# Patient Record
Sex: FEMALE | Race: WHITE
[De-identification: names, ages, dates, MRNs, and addresses within clinical notes are randomized per-mention and may not be internally consistent; named-entity substitution may affect disease eponyms.]

---

## 2017-10-12 ENCOUNTER — HOSPITAL ENCOUNTER (OUTPATIENT)
Dept: HOSPITAL 75 - RAD | Age: 72
End: 2017-10-12
Payer: MEDICARE

## 2017-10-12 DIAGNOSIS — Z12.31: Primary | ICD-10-CM

## 2017-10-12 PROCEDURE — 77067 SCR MAMMO BI INCL CAD: CPT

## 2017-10-13 NOTE — DIAGNOSTIC IMAGING REPORT
Bilateral screening mammogram 2D views with tomosynthesis



The current study was also evaluated with a Computer Aided

Detection (CAD) system.



INDICATION: Screening. No current complaints stated on the

questionnaire.



COMPARISON: 06/01/2015.



FINDINGS:

The breasts are composed of heterogeneously dense parenchyma

which may decrease mammographic sensitivity. There is no mass,

architectural distortion, or suspicious cluster of calcifications

seen. Allowing for technique and positional differences, no

suspicious change is seen.



IMPRESSION: No significant change.



ACR BI-RADS Category 2: Benign findings.

Result letter will be mailed to the patient.

Note: At least 10% of breast cancer is not imaged by mammography.



  Dictated on workstation # [id number]

## 2018-10-31 ENCOUNTER — HOSPITAL ENCOUNTER (OUTPATIENT)
Dept: HOSPITAL 75 - RAD | Age: 73
End: 2018-10-31
Payer: MEDICARE

## 2018-10-31 DIAGNOSIS — R92.8: ICD-10-CM

## 2018-10-31 DIAGNOSIS — Z12.31: Primary | ICD-10-CM

## 2018-10-31 PROCEDURE — 77067 SCR MAMMO BI INCL CAD: CPT

## 2018-12-03 ENCOUNTER — HOSPITAL ENCOUNTER (OUTPATIENT)
Dept: HOSPITAL 75 - RAD | Age: 73
End: 2018-12-03
Payer: MEDICARE

## 2018-12-03 DIAGNOSIS — R92.2: Primary | ICD-10-CM

## 2018-12-03 PROCEDURE — 76642 ULTRASOUND BREAST LIMITED: CPT

## 2018-12-03 NOTE — DIAGNOSTIC IMAGING REPORT
INDICATION: Right breast nodularity.



Correlation is made with diagnostic mammogram earlier the same

day and screening mammogram from 10/31/2018.



Sonographic interrogation of the lower outer left breast was

performed. No sonographic abnormality is seen. No solid or cystic

mass is detected.



IMPRESSION: 



BI-RADS 1. 



No sonographic abnormality is seen. The patient may return to

routine annual screening mammography.



ACR BI-RADS Category 1: Negative.

Result letter will be mailed to the patient.

Note:  At least 10% of breast cancer is not imaged by

mammography.



Dictated by: 



  Dictated on workstation # [id number]

## 2018-12-03 NOTE — DIAGNOSTIC IMAGING REPORT
Indication: Left breast density. Patient presents for additional

views.



Correlation is made with recent screening mammogram from

10/31/2018.



Unilateral left 2-D and 3-D diagnostic mammography was performed

including spot compression cc and ML views as well as 90 degree

lateral view and rolled cc views.



 There is mild residual density in the outer aspect of the left

breast which appears to be inferiorly located on the tomographic

images. Further evaluation of this area with  ultrasound is

recommended. No suspicious calcifications are seen.



Impression: BI-RADS zero



Mild residual density in the lower outer left breast posterior

depth 10 cm from the nipple after additional views. Further

evaluation with ultrasound is recommended.



ACR BI-RADS Category 0: Incomplete. (Needs additional imaging

evaluation).

Result letter will be mailed to the patient.

Note: At least 10% of breast cancer is not imaged by mammography.



Dictated by: 



  Dictated on workstation # [id number]

## 2019-07-16 ENCOUNTER — HOSPITAL ENCOUNTER (OUTPATIENT)
Dept: HOSPITAL 75 - ER | Age: 74
Setting detail: OBSERVATION
LOS: 1 days | Discharge: NURSING, REHAB OR LONG-TERM CARE | End: 2019-07-17
Attending: FAMILY MEDICINE | Admitting: FAMILY MEDICINE
Payer: MEDICARE

## 2019-07-16 VITALS — WEIGHT: 168.02 LBS | HEIGHT: 65 IN | BODY MASS INDEX: 27.99 KG/M2

## 2019-07-16 VITALS — DIASTOLIC BLOOD PRESSURE: 94 MMHG | SYSTOLIC BLOOD PRESSURE: 135 MMHG

## 2019-07-16 VITALS — DIASTOLIC BLOOD PRESSURE: 73 MMHG | SYSTOLIC BLOOD PRESSURE: 134 MMHG

## 2019-07-16 DIAGNOSIS — Z88.7: ICD-10-CM

## 2019-07-16 DIAGNOSIS — S32.019A: Primary | ICD-10-CM

## 2019-07-16 DIAGNOSIS — Z88.8: ICD-10-CM

## 2019-07-16 DIAGNOSIS — Z79.899: ICD-10-CM
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
## 2019-07-16 DIAGNOSIS — F02.80: ICD-10-CM

## 2019-07-16 DIAGNOSIS — Z79.891: ICD-10-CM

## 2019-07-16 DIAGNOSIS — Z80.3: ICD-10-CM

## 2019-07-16 DIAGNOSIS — Z88.5: ICD-10-CM

## 2019-07-16 DIAGNOSIS — Z98.42: ICD-10-CM

## 2019-07-16 DIAGNOSIS — R10.13: ICD-10-CM

## 2019-07-16 DIAGNOSIS — Z98.41: ICD-10-CM

## 2019-07-16 DIAGNOSIS — Z91.048: ICD-10-CM

## 2019-07-16 DIAGNOSIS — Z80.1: ICD-10-CM

## 2019-07-16 DIAGNOSIS — I10: ICD-10-CM

## 2019-07-16 DIAGNOSIS — Z79.82: ICD-10-CM

## 2019-07-16 DIAGNOSIS — Z80.0: ICD-10-CM

## 2019-07-16 DIAGNOSIS — F32.9: ICD-10-CM

## 2019-07-16 DIAGNOSIS — Z90.710: ICD-10-CM

## 2019-07-16 DIAGNOSIS — G30.9: ICD-10-CM
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
## 2019-07-16 LAB
ALBUMIN SERPL-MCNC: 4.2 GM/DL (ref 3.2–4.5)
ALP SERPL-CCNC: 73 U/L (ref 40–136)
ALT SERPL-CCNC: 22 U/L (ref 0–55)
BASOPHILS # BLD AUTO: 0 10^3/UL (ref 0–0.1)
BASOPHILS NFR BLD AUTO: 1 % (ref 0–10)
BILIRUB SERPL-MCNC: 0.4 MG/DL (ref 0.1–1)
BUN/CREAT SERPL: 19
CALCIUM SERPL-MCNC: 9.8 MG/DL (ref 8.5–10.1)
CHLORIDE SERPL-SCNC: 106 MMOL/L (ref 98–107)
CO2 SERPL-SCNC: 26 MMOL/L (ref 21–32)
CREAT SERPL-MCNC: 1.02 MG/DL (ref 0.6–1.3)
EOSINOPHIL # BLD AUTO: 0.1 10^3/UL (ref 0–0.3)
EOSINOPHIL NFR BLD AUTO: 1 % (ref 0–10)
ERYTHROCYTE [DISTWIDTH] IN BLOOD BY AUTOMATED COUNT: 13.6 % (ref 10–14.5)
GFR SERPLBLD BASED ON 1.73 SQ M-ARVRAT: 53 ML/MIN
GLUCOSE SERPL-MCNC: 103 MG/DL (ref 70–105)
HCT VFR BLD CALC: 40 % (ref 35–52)
HGB BLD-MCNC: 13.1 G/DL (ref 11.5–16)
LYMPHOCYTES # BLD AUTO: 1.8 X 10^3 (ref 1–4)
LYMPHOCYTES NFR BLD AUTO: 21 % (ref 12–44)
MANUAL DIFFERENTIAL PERFORMED BLD QL: NO
MCH RBC QN AUTO: 29 PG (ref 25–34)
MCHC RBC AUTO-ENTMCNC: 33 G/DL (ref 32–36)
MCV RBC AUTO: 88 FL (ref 80–99)
MONOCYTES # BLD AUTO: 0.7 X 10^3 (ref 0–1)
MONOCYTES NFR BLD AUTO: 8 % (ref 0–12)
NEUTROPHILS # BLD AUTO: 5.8 X 10^3 (ref 1.8–7.8)
NEUTROPHILS NFR BLD AUTO: 70 % (ref 42–75)
PLATELET # BLD: 253 10^3/UL (ref 130–400)
PMV BLD AUTO: 9.4 FL (ref 7.4–10.4)
POTASSIUM SERPL-SCNC: 4.3 MMOL/L (ref 3.6–5)
PROT SERPL-MCNC: 6.6 GM/DL (ref 6.4–8.2)
SODIUM SERPL-SCNC: 140 MMOL/L (ref 135–145)
WBC # BLD AUTO: 8.4 10^3/UL (ref 4.3–11)

## 2019-07-16 PROCEDURE — 72125 CT NECK SPINE W/O DYE: CPT

## 2019-07-16 PROCEDURE — 93005 ELECTROCARDIOGRAM TRACING: CPT

## 2019-07-16 PROCEDURE — 36415 COLL VENOUS BLD VENIPUNCTURE: CPT

## 2019-07-16 PROCEDURE — 74177 CT ABD & PELVIS W/CONTRAST: CPT

## 2019-07-16 PROCEDURE — 96374 THER/PROPH/DIAG INJ IV PUSH: CPT

## 2019-07-16 PROCEDURE — 94664 DEMO&/EVAL PT USE INHALER: CPT

## 2019-07-16 PROCEDURE — 80053 COMPREHEN METABOLIC PANEL: CPT

## 2019-07-16 PROCEDURE — 72170 X-RAY EXAM OF PELVIS: CPT

## 2019-07-16 PROCEDURE — 96361 HYDRATE IV INFUSION ADD-ON: CPT

## 2019-07-16 PROCEDURE — 85025 COMPLETE CBC W/AUTO DIFF WBC: CPT

## 2019-07-16 PROCEDURE — 96375 TX/PRO/DX INJ NEW DRUG ADDON: CPT

## 2019-07-16 PROCEDURE — 71260 CT THORAX DX C+: CPT

## 2019-07-16 PROCEDURE — 70450 CT HEAD/BRAIN W/O DYE: CPT

## 2019-07-16 RX ADMIN — SODIUM CHLORIDE, SODIUM LACTATE, POTASSIUM CHLORIDE, AND CALCIUM CHLORIDE SCH MLS/HR: 600; 310; 30; 20 INJECTION, SOLUTION INTRAVENOUS at 18:07

## 2019-07-16 RX ADMIN — OXYCODONE HYDROCHLORIDE AND ACETAMINOPHEN PRN TAB: 5; 325 TABLET ORAL at 18:30

## 2019-07-16 NOTE — XMS REPORT
[facility]

                             Created on: 2016



Estrella Rose

External Reference #: [id number]

: 1945

Sex: Female



Demographics







                          Address                   [address]

[place]  [address]

 

                          Home Phone                [phone]

 

                          Preferred Language        Unknown

 

                          Marital Status            Unknown

 

                          Denominational Affiliation     Unknown

 

                          Race                      White

 

                          Ethnic Group              Not  or 





Author







                          Author                    ARELY PIMENTEL

 

                          [facility]              [employer]

 

                          Address                   Unknown

 

                          Phone                     Unavailable







Care Team Providers







                    Care Team Member Name    Role                Phone

 

                    ARELY PIMENTEL    CP                  Unavailable



                                                                



Allergies

          No Known Allergies                                                    
                                   



Problems

          





             Problem Type    Condition    Code         Onset Dates    Condition Status

 

             Assessment    Bipolar II disorder    F31.81                    Active

 

             Problem      Bipolar II disorder    F31.81                    Active



                                                                                
                 



Medications

          No Known Medications                                                  
                                     



Procedures

          





                Procedure       Coding System    Code            Date

 

                Psychotherapy, patient &/family, 45 minutes, established patient    CPT-4           90660           

2016

 

                [facility] VISIT MENTAL HEALTH ESTAB PT    CPT-4                      2016



                                                                                
       



Results

          No Known Results                                                      
             



Summary Purpose

          eClinicalWorks Submission

## 2019-07-16 NOTE — XMS REPORT
[facility]

                             Created on: 2016



Estrella Rose

External Reference #: [id number]

: 1945

Sex: Female



Demographics







                          Address                   [address]

[place]  [address]

 

                          Home Phone                [phone]

 

                          Preferred Language        Unknown

 

                          Marital Status            Unknown

 

                          Scientologist Affiliation     Unknown

 

                          Race                      White

 

                          Ethnic Group              Not  or 





Author







                          Author                    ARELY PIMENTEL

 

                          [facility]              e[employer]

 

                          Address                   Unknown

 

                          Phone                     Unavailable







Care Team Providers







                    Care Team Member Name    Role                Phone

 

                    ARELY PIMENTEL    CP                  Unavailable



                                                                



Allergies

          No Known Allergies                                                    
                                   



Problems

          





             Problem Type    Condition    Code         Onset Dates    Condition Status

 

             Assessment    Bipolar II disorder    F31.81                    Active

 

             Problem      Bipolar II disorder    F31.81                    Active



                                                                                
                 



Medications

          No Known Medications                                                  
                                     



Procedures

          





                Procedure       Coding System    Code            Date

 

                Psychotherapy, patient &/family, 45 minutes, established patient    CPT-4           29740           

2016

 

                [facility] VISIT MENTAL HEALTH ESTAB PT    CPT-4                      2016



                                                                                
       



Results

          No Known Results                                                      
             



Summary Purpose

          eClinicalWorks Submission

## 2019-07-16 NOTE — XMS REPORT
[facility]

                             Created on: 10/08/2017



Estrella Rose

External Reference #: [id number]

: 1945

Sex: Female



Demographics







                          Address                   [address]

[place]  [address]

 

                          Preferred Language        Unknown

 

                          Marital Status            Unknown

 

                          Samaritan Affiliation     Unknown

 

                          Race                      Unknown

 

                          Ethnic Group              Unknown





Author







                          Author                    SHANE POWERS

 

                          [facility]

 

                          Address                   [facility] [place]  [address]



 

                          Phone                     [phone]







Care Team Providers







                    Care Team Member Name    Role                Phone

 

                    SHANE POWERS    Unavailable         [phone]







PROBLEMS







          Type      Condition    ICD9-CM Code    JGH84-YF Code    Onset Dates    Condition Status    SNOMED

 Code

 

          Problem    Bipolar I disorder with anxious distress              F31.9               Active    [id number]









ALLERGIES

No Information



SOCIAL HISTORY

Never Assessed



PLAN OF CARE







                          Activity                  Details









                                         









                          Follow Up                 Next available Reason:BH Follow-up







VITAL SIGNS





MEDICATIONS







        Medication    Instructions    Dosage    Frequency    Start Date    End Date    Duration    Status



 

        BusPIRone HCl                                                    Active

 

        Lamotrigine                                                    Active

 

        Venlafaxine HCl ER                                                    Active

 

        Omeprazole                                                    Active

 

        Colace                                                    Active

 

        Simvastatin                                                    Active

 

        Latanoprost                                                    Active

 

        Lisinopril                                                    Active

 

        Lorazepam                                                    Active







RESULTS

No Results



PROCEDURES







                Procedure       Date Ordered    Result          Body Site

 

                [facility] VISIT MENTAL HEALTH ESTAB PT    2017                     

 

                          Psychotherapy, patient &/family, 30 minutes, established patient    2017

                                                     







IMMUNIZATIONS

No Known Immunizations

## 2019-07-16 NOTE — XMS REPORT
[facility]

                             Created on: 2018



Estrella Rose

External Reference #: [id number]

: 1945

Sex: Female



Demographics







                          Address                   [address] [place]  [address]

 

                          Preferred Language        Unknown

 

                          Marital Status            Unknown

 

                          Mosque Affiliation     Unknown

 

                          Race                      Unknown

 

                          Ethnic Group              Unknown





Author







                          Author                    SHANE POWERS

 

                          Organization              [facility]

 

                          Address                   [address] [place]  [address]



 

                          Phone                     [phone]







Care Team Providers







                    Care Team Member Name    Role                Phone

 

                    GIOSHABBIRELA    Unavailable         [phone]







PROBLEMS







          Type      Condition    ICD9-CM Code    PHX66-AK Code    Onset Dates    Condition Status    SNOMED

 Code

 

          Problem    Bipolar I disorder with anxious distress              F31.9               Active    [id number]









ALLERGIES

No Information



ENCOUNTERS







                Encounter       Location        Date            Diagnosis

 

                          [facility]     [id number] [place] [address]

                          18 Sep, 2018               

 

                          [place] [id number] [place] [address]

                          13 Aug, 2018              Bipolar I disorder with anxious distress F31.9

 

                          [facility]     [id number] [place] [address]

                                        Bipolar I disorder with anxious distress F31.9

 

                          [facility]     [id number] [place] [address]

                                        Bipolar I disorder with anxious distress F31.9

 

                          [facility]     [id number] [place] [address]

                                        Bipolar I disorder with anxious distress F31.9

 

                          [facility]     [id number] [place] [address]

                          26 Mar, 2018              Bipolar I disorder with anxious distress F31.9

 

                          [facility]     [id number] [place] [address]

                                        Bipolar I disorder with anxious distress F31.9

 

                          [facility]     [id number] [place] [address]

                                        Bipolar I disorder with anxious distress F31.9

 

                          [facility]     [id number] [address]

                                        Bipolar I disorder with anxious distress F31.9

 

                          [facility]     [id number] [address]

                          30 Oct, 2017              Bipolar I disorder with anxious distress F31.9

 

                          [facility]     [id number] [address]

                          02 Oct, 2017              Bipolar I disorder with anxious distress F31.9

 

                          [facility]     [id number] [address]

                          08 Aug, 2017              Bipolar I disorder with anxious distress F31.9

 

                          [facility]     [address] [place] [address]

                                        Bipolar I disorder with anxious distress F31.9

 

                          [facility]     [id number] [address]

                                        Bipolar II disorder F31.81

 

                          [facility]     [id number] [address]

                                         

 

                          [facility]     [id number] [place] [address]

                                        Bipolar II disorder F31.81

 

                          [facility]     [id number] [address]

                          31 May, 2017              Bipolar II disorder F31.81

 

                          [facility]     [id number] [address]

                          17 May, 2017              Bipolar II disorder F31.81

 

                          [facility]     [id number] [address]

                          03 May, 2017              Bipolar II disorder F31.81

 

                          [facility]     [id number] [address]

                                        Bipolar II disorder F31.81

 

                          [facility]     [id number] [address]

                          21 Mar, 2017              Bipolar II disorder F31.81

 

                          [facility]     [address] [facility] [id number] [address]

                                        Bipolar II disorder F31.81

 

                          [facility]     [address] [facility] [id number] [address]

                          13 Oct, 2016              Bipolar II disorder F31.81

 

                          [facility]     [id number] [address]

                          23 Sep, 2016              Bipolar II disorder F31.81

 

                          [facility]     [address] [facility] [id number] [place] [address]

                          15 Sep, 2016              Bipolar II disorder F31.81

 

                          [facility]     [address] [facility] [id number] [place] [address]

                          01 Sep, 2016              Bipolar II disorder F31.81

 

                          [facility]     [id number] [address]

                          18 Aug, 2016              Bipolar II disorder F31.81

 

                          [facility]     [id number] [address]

                          04 Aug, 2016              Bipolar II disorder F31.81

 

                          [facility]     [id number] [place] [address]

                                        Bipolar II disorder F31.81

 

                          [facility]     [id number] [address]

                                        Bipolar II disorder F31.81

 

                          [facility]     [id number] [address]

                          10 Gary, 2016              Bipolar II disorder F31.81

 

                          [facility]     [id number] [address]

                          25 May, 2016              Bipolar II disorder F31.81

 

                          [facility]     [id number] [address]

                          03 May, 2016              Bipolar II disorder F31.81

 

                          [facility]     [id number] [address]

                                        Bipolar II disorder F31.81

 

                          [facility]     [id number] [address]

                          30 Mar, 2016              Bipolar II disorder F31.81

 

                          [facility]     [id number] [address]

                                        Bipolar II disorder F31.81

 

                          [facility]     [id number] [address]

                                        Bipolar II disorder F31.81

 

                          [facility]     [id number] [address]

                          15 Abdirahman, 2016              Bipolar II disorder F31.81

 

                          [facility]     [address] [place] [address]

                                        Bipolar II disorder F31.81

 

                          [facility]     [address] [address]

                          30 Oct, 2015              Bipolar II disorder F31.81

 

                          [facility]     [id number] [address]

                          07 Oct, 2015              Bipolar II disorder F31.81

 

                          [facility]     [id number] [address]

                          22 Sep, 2015              Bipolar II disorder 296.89

 

                          [facility]     [id number] [address]

                          28 Aug, 2015              Bipolar II disorder 296.89







IMMUNIZATIONS

No Known Immunizations



SOCIAL HISTORY

Never Assessed



REASON FOR VISIT

 Follow-up Bipolar Disorder



PLAN OF CARE







                          Activity                  Details









                                         









                          Follow Up                 4 Weeks Reason: Follow-up







VITAL SIGNS





MEDICATIONS

Unknown Medications



RESULTS

No Results



PROCEDURES







                Procedure       Date Ordered    Result          Body Site

 

                [facility] VISIT MENTAL HEALTH ESTAB PT    2018                     

 

                          Psychotherapy, patient &/family, 30 minutes, established patient    2018 

                                                     







INSTRUCTIONS





MEDICATIONS ADMINISTERED

No Known Medications
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
## 2019-07-16 NOTE — XMS REPORT
[facility]

                             Created on: 2018



Estrella Rose

External Reference #: [id number]

: 1945

Sex: Female



Demographics







                          Address                   [address] [place]  [address]

 

                          Preferred Language        Unknown

 

                          Marital Status            Unknown

 

                          Church Affiliation     Unknown

 

                          Race                      Unknown

 

                          Ethnic Group              Unknown





Author







                          Author                    SHANE POWERS

 

                          Organization              [facility]

 

                          Address                   [address] [place]  [address]



 

                          Phone                     [phone]







Care Team Providers







                    Care Team Member Name    Role                Phone

 

                    GIOSHABBIRELA    Unavailable         [phone]







PROBLEMS







          Type      Condition    ICD9-CM Code    HGJ83-ZW Code    Onset Dates    Condition Status    SNOMED

 Code

 

          Problem    Bipolar I disorder with anxious distress              F31.9               Active    [id number]









ALLERGIES

No Information



ENCOUNTERS







                Encounter       Location        Date            Diagnosis

 

                          [facility]     [id number] [place] [address]

                                         

 

                          [place] [id number] [place] [address]

                          26 Mar, 2018              Bipolar I disorder with anxious distress F31.9

 

                          [facility]     [id number] [place] [address]

                                        Bipolar I disorder with anxious distress F31.9

 

                          [facility]     [id number] [place] [address]

                                        Bipolar I disorder with anxious distress F31.9

 

                          [facility]     [id number] [place] [address]

                                        Bipolar I disorder with anxious distress F31.9

 

                          [facility]     [id number] [place] [address]

                          30 Oct, 2017              Bipolar I disorder with anxious distress F31.9

 

                          [facility]     [id number] [place] [address]

                          02 Oct, 2017              Bipolar I disorder with anxious distress F31.9

 

                          [facility]     [id number] [place] [address]

                          08 Aug, 2017              Bipolar I disorder with anxious distress F31.9

 

                          [facility]     [id number] [address]

                                        Bipolar I disorder with anxious distress F31.9

 

                          [facility]     [address] [place] [address]

                                        Bipolar II disorder F31.81

 

                          [facility]     [address] [place] [address]

                                         

 

                          [facility]     [id number] [place] [address]

                                        Bipolar II disorder F31.81

 

                          [facility]     [address] [place] [address]

                          31 May, 2017              Bipolar II disorder F31.81

 

                          [facility]     [address] [place] [address]

                          17 May, 2017              Bipolar II disorder F31.81

 

                          [facility]     [address] [place] [address]

                          03 May, 2017              Bipolar II disorder F31.81

 

                          [facility]     [address] [place] [address]

                                        Bipolar II disorder F31.81

 

                          [facility]     [address] [address]

                          21 Mar, 2017              Bipolar II disorder F31.81

 

                          [facility]     [address] [place] [address]

                                        Bipolar II disorder F31.81

 

                          [facility]     [address] [address]

                          13 Oct, 2016              Bipolar II disorder F31.81

 

                          [facility]     [address] [place] [address]

                          23 Sep, 2016              Bipolar II disorder F31.81

 

                          [facility]     [address] [address]

                          15 Sep, 2016              Bipolar II disorder F31.81

 

                          [facility]     [address] [address]

                          01 Sep, 2016              Bipolar II disorder F31.81

 

                          [facility]     [address] [place] [address], [place] [address]

                          18 Aug, 2016              Bipolar II disorder F31.81

 

                          [facility]     [address] [place] [address]

                          04 Aug, 2016              Bipolar II disorder F31.81

 

                          [facility]     [address] [place] [address]

                                        Bipolar II disorder F31.81

 

                          [facility]     [address] [place] [address]

                                        Bipolar II disorder F31.81

 

                          [facility]     [address] [place] [address]

                          10 Gary, 2016              Bipolar II disorder F31.81

 

                          [facility]     [address] [place] [address]

                          25 May, 2016              Bipolar II disorder F31.81

 

                          [facility]     [address] [place] [address]

                          03 May, 2016              Bipolar II disorder F31.81

 

                          [facility]     [address] [place] [address]

                                        Bipolar II disorder F31.81

 

                          [facility]     [address] [place] [address]

                          30 Mar, 2016              Bipolar II disorder F31.81

 

                          [facility]     [address] [place] [address]

                                        Bipolar II disorder F31.81

 

                          [facility]     [address] [place] [address]

                                        Bipolar II disorder F31.81

 

                          [facility]     [address] [address]

                          15 Abdirahman, 2016              Bipolar II disorder F31.81

 

                          [facility]     [id number] [address]

                                        Bipolar II disorder F31.81

 

                          [facility]     [id number] [address]

                          30 Oct, 2015              Bipolar II disorder F31.81

 

                          [facility]     [id number] [address]

                          07 Oct, 2015              Bipolar II disorder F31.81

 

                          [facility]     [id number] [address]

                          22 Sep, 2015              Bipolar II disorder 296.89

 

                          [facility]     [id number] [address]

                          28 Aug, 2015              Bipolar II disorder 296.89







IMMUNIZATIONS

No Known Immunizations



SOCIAL HISTORY

Never Assessed



REASON FOR VISIT

 Follow-up Bipolar Disorder



PLAN OF CARE







                          Activity                  Details









                                         









                          Follow Up                 Next available Reason: Follow-up







VITAL SIGNS





MEDICATIONS

Unknown Medications



RESULTS

No Results



PROCEDURES







                Procedure       Date Ordered    Result          Body Site

 

                [facility] ESTAB PT    Aug 08, 2017                     

 

                    Psychotherapy, patient &/family, 45 minutes, established patient    Aug 08, 2017        

                                         







INSTRUCTIONS





MEDICATIONS ADMINISTERED

No Known Medications

## 2019-07-16 NOTE — XMS REPORT
[facility]

                             Created on: 2017



Estrella Rose

External Reference #: [id number]

: 1945

Sex: Female



Demographics







                          Address                   [address]

[place]  [address]

 

                          Preferred Language        Unknown

 

                          Marital Status            Unknown

 

                          Sikh Affiliation     Unknown

 

                          Race                      Unknown

 

                          Ethnic Group              Unknown





Author







                          Author                    ARELY PIMENTEL

 

                          Organization              [facility]

 

                          Address                   [address] [place]  [address]



 

                          Phone                     [phone]







Care Team Providers







                    Care Team Member Name    Role                Phone

 

                    ARELY PIMENTEL    Unavailable         [phone]







PROBLEMS







          Type      Condition    ICD9-CM Code    QCJ07-GJ Code    Onset Dates    Condition Status    SNOMED

 Code

 

          Problem    Bipolar II disorder              F31.81              Active    [id number]

 

          Assessment    Bipolar II disorder              F31.81    23 Sep, 2016    Active    [id number]







ALLERGIES

Unknown Allergies



SOCIAL HISTORY

No smoking Hx information available



PLAN OF CARE





VITAL SIGNS





MEDICATIONS

Unknown Medications



RESULTS

No Results



PROCEDURES







                Procedure       Date Ordered    Related Diagnosis    Body Site

 

                [facility] VISIT MENTAL HEALTH ESTAB PT    2016                     

 

                          Psychotherapy, patient &/family, 45 minutes, established patient    2016 

                                                     







IMMUNIZATIONS

No Known Immunizations

## 2019-07-16 NOTE — XMS REPORT
[facility]

                             Created on: 2016



Estrella Rose

External Reference #: [id number]

: 1945

Sex: Female



Demographics







                          Address                   [address]

[place]  [address]

 

                          Home Phone                [phone]

 

                          Preferred Language        Unknown

 

                          Marital Status            Unknown

 

                          Pentecostal Affiliation     Unknown

 

                          Race                      White

 

                          Ethnic Group              Not  or 





Author







                          Author                    ARELY PIMENTEL

 

                          [facility]              [employer]

 

                          Address                   Unknown

 

                          Phone                     Unavailable







Care Team Providers







                    Care Team Member Name    Role                Phone

 

                    ARELY PIMENTEL    CP                  Unavailable



                                                                



Allergies

          No Known Allergies                                                    
                                   



Problems

          





             Problem Type    Condition    Code         Onset Dates    Condition Status

 

             Assessment    Bipolar II disorder    F31.81                    Active

 

             Problem      Bipolar II disorder    F31.81                    Active



                                                                                
                 



Medications

          No Known Medications                                                  
                                     



Procedures

          





                Procedure       Coding System    Code            Date

 

                Psychotherapy, patient &/family, 45 minutes, established patient    CPT-4           69843           

2016

 

                [facility] VISIT MENTAL HEALTH ESTAB PT    CPT-4                      2016



                                                                                
       



Results

          No Known Results                                                      
             



Summary Purpose

          [employer] Submission

## 2019-07-16 NOTE — XMS REPORT
[facility]

                             Created on: 2018



Estrella Rose

External Reference #: [id number]

: 1945

Sex: Female



Demographics







                          Address                   [address] [place]  [address]

 

                          Preferred Language        Unknown

 

                          Marital Status            Unknown

 

                          Zoroastrianism Affiliation     Unknown

 

                          Race                      Unknown

 

                          Ethnic Group              Unknown





Author







                          Author                    SHANE POWERS

 

                          Organization              [facility]

 

                          Address                   [address] [place]  [address]



 

                          Phone                     [phone]







Care Team Providers







                    Care Team Member Name    Role                Phone

 

                    GIOSHABBIRELA    Unavailable         [phone]







PROBLEMS







          Type      Condition    ICD9-CM Code    WLG16-TS Code    Onset Dates    Condition Status    SNOMED

 Code

 

          Problem    Bipolar I disorder with anxious distress              F31.9               Active    [id number]









ALLERGIES

No Information



ENCOUNTERS







                Encounter       Location        Date            Diagnosis

 

                          [facility]     [id number] [place] [address]

                                         

 

                          [place] [id number] [place] [address]

                                        Bipolar I disorder with anxious distress F31.9

 

                          [facility]     [id number] [place] [address]

                          26 Mar, 2018              Bipolar I disorder with anxious distress F31.9

 

                          [facility]     [id number] [place] [address]

                                        Bipolar I disorder with anxious distress F31.9

 

                          [facility]     [id number] [place] [address]

                                        Bipolar I disorder with anxious distress F31.9

 

                          [facility]     [id number] [place] [address]

                                        Bipolar I disorder with anxious distress F31.9

 

                          [facility]     [id number] [place] [address]

                          30 Oct, 2017              Bipolar I disorder with anxious distress F31.9

 

                          [facility]     [id number] [place] [address]

                          02 Oct, 2017              Bipolar I disorder with anxious distress F31.9

 

                          [facility]     [id number] [address]

                          08 Aug, 2017              Bipolar I disorder with anxious distress F31.9

 

                          [facility]     [id number] [address]

                                        Bipolar I disorder with anxious distress F31.9

 

                          [facility]     [id number] [address]

                                        Bipolar II disorder F31.81

 

                          [facility]     [id number] [address]

                                         

 

                          [facility]     [id number] [place] [address]

                                        Bipolar II disorder F31.81

 

                          [facility]     [id number] [address]

                          31 May, 2017              Bipolar II disorder F31.81

 

                          [facility]     [id number] [address]

                          17 May, 2017              Bipolar II disorder F31.81

 

                          [facility]     [address] [place] [address]

                          03 May, 2017              Bipolar II disorder F31.81

 

                          [facility]     [id number] [address]

                                        Bipolar II disorder F31.81

 

                          [facility]     [id number] [address]

                          21 Mar, 2017              Bipolar II disorder F31.81

 

                          [facility]     [id number] [address]

                                        Bipolar II disorder F31.81

 

                          [facility]     [id number] [address]

                          13 Oct, 2016              Bipolar II disorder F31.81

 

                          [facility]     [id number] [address]

                          23 Sep, 2016              Bipolar II disorder F31.81

 

                          [facility]     [id number] [address]

                          15 Sep, 2016              Bipolar II disorder F31.81

 

                          [facility]     [id number] [place] [address]

                          01 Sep, 2016              Bipolar II disorder F31.81

 

                          [facility]     [id number] [place] [address]

                          18 Aug, 2016              Bipolar II disorder F31.81

 

                          [facility]     [address] [place] [address]

                          04 Aug, 2016              Bipolar II disorder F31.81

 

                          [facility]     [address] [place] [address], [place] [address]

                                        Bipolar II disorder F31.81

 

                          [facility]     [id number] [address]

                                        Bipolar II disorder F31.81

 

                          [facility]     [address] [place] [address]

                          10 Gary, 2016              Bipolar II disorder F31.81

 

                          [facility]     [address] [place] [address]

                          25 May, 2016              Bipolar II disorder F31.81

 

                          [facility]     [id number] [address]

                          03 May, 2016              Bipolar II disorder F31.81

 

                          [facility]     [id number] [address]

                                        Bipolar II disorder F31.81

 

                          [facility]     [id number] [address]

                          30 Mar, 2016              Bipolar II disorder F31.81

 

                          [facility]     [id number] [place] [address]

                                        Bipolar II disorder F31.81

 

                          [facility]     [id number] [address]

                                        Bipolar II disorder F31.81

 

                          [facility]     [id number] [address]

                          15 Abdirahman, 2016              Bipolar II disorder F31.81

 

                          [facility]     [id number] [address]

                                        Bipolar II disorder F31.81

 

                          [facility]     [id number] [address]

                          30 Oct, 2015              Bipolar II disorder F31.81

 

                          [facility]     [id number] [address]

                          07 Oct, 2015              Bipolar II disorder F31.81

 

                          [facility]     [id number] [address]

                          22 Sep, 2015              Bipolar II disorder 296.89

 

                          [facility]     [id number] [address]

                          28 Aug, 2015              Bipolar II disorder 296.89







IMMUNIZATIONS

No Known Immunizations



SOCIAL HISTORY

Never Assessed



REASON FOR VISIT

 Follow-up Bipolar Disorder



PLAN OF CARE







                          Activity                  Details









                                         









                          Follow Up                 3 Weeks Reason: Follow-up







VITAL SIGNS





MEDICATIONS

Unknown Medications



RESULTS

No Results



PROCEDURES







                Procedure       Date Ordered    Result          Body Site

 

                [facility] VISIT MENTAL HEALTH ESTAB PT    Oct 02, 2017                     

 

                    Psychotherapy, patient &/family, 45 minutes, established patient    Oct 02, 2017        

                                         







INSTRUCTIONS





MEDICATIONS ADMINISTERED

No Known Medications

## 2019-07-16 NOTE — XMS REPORT
[facility]

                             Created on: 2017



Estrella Rose

External Reference #: [id number]

: 1945

Sex: Female



Demographics







                          Address                   [address]

[place]  [address]

 

                          Preferred Language        Unknown

 

                          Marital Status            Unknown

 

                          Sabianism Affiliation     Unknown

 

                          Race                      Unknown

 

                          Ethnic Group              Unknown





Author







                          Author                    ARELY PIMENTEL

 

                          Organization              [facility]

 

                          Address                   [address] [place]  [address]



 

                          Phone                     [phone]







Care Team Providers







                    Care Team Member Name    Role                Phone

 

                    ARELY PIMENTEL    Unavailable         [phone]







PROBLEMS







          Type      Condition    ICD9-CM Code    RPN05-SL Code    Onset Dates    Condition Status    SNOMED

 Code

 

          Problem    Bipolar II disorder              F31.81              Active    [id number]

 

          Assessment    Bipolar II disorder              F31.81    15 Sep, 2016    Active    [id number]







ALLERGIES

Unknown Allergies



SOCIAL HISTORY

No smoking Hx information available



PLAN OF CARE





VITAL SIGNS





MEDICATIONS

Unknown Medications



RESULTS

No Results



PROCEDURES







                Procedure       Date Ordered    Related Diagnosis    Body Site

 

                [facility] VISIT MENTAL HEALTH ESTAB PT    Sept 15, 2016                     

 

                          Psychotherapy, patient &/family, 45 minutes, established patient    Sept 15, 2016 

                                                     







IMMUNIZATIONS

No Known Immunizations

## 2019-07-16 NOTE — XMS REPORT
[facility]

                             Created on: 2015



Estrella Rose

External Reference #: [id number]

: 1945

Sex: Female



Demographics







                          Address                   [address]

[place]  [address]

 

                          Home Phone                [phone]

 

                          Preferred Language        Unknown

 

                          Marital Status            Unknown

 

                          Amish Affiliation     Unknown

 

                          Race                      White

 

                          Ethnic Group              Not  or 





Author







                          Author                    ARELY PIMENTEL

 

                          [facility]              [employer]

 

                          Address                   Unknown

 

                          Phone                     Unavailable







Care Team Providers







                    Care Team Member Name    Role                Phone

 

                    ARELY PIMENTEL    CP                  Unavailable



                                                                



Allergies

          No Known Allergies                                                    
                                   



Problems

          





             Problem Type    Condition    Code         Onset Dates    Condition Status

 

             Assessment    Bipolar II disorder    F31.81                    Active

 

             Problem      Bipolar II disorder    F31.81                    Active



                                                                                
                 



Medications

          No Known Medications                                                  
                                     



Procedures

          





                Procedure       Coding System    Code            Date

 

                Psychotherapy, patient &/family, 45 minutes, established patient    CPT-4           09527           

2015

 

                [facility] VISIT MENTAL HEALTH ESTAB PT    CPT-4                      2015



                                                                                
       



Results

          No Known Results                                                      
             



Summary Purpose

          [employer] Submission
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
## 2019-07-16 NOTE — XMS REPORT
[facility]

                             Created on: 10/04/2018



Estrella Rose

External Reference #: [id number]

: 1945

Sex: Female



Demographics







                          Address                   [address] [place]  [address]

 

                          Preferred Language        Unknown

 

                          Marital Status            Unknown

 

                          Lutheran Affiliation     Unknown

 

                          Race                      Unknown

 

                          Ethnic Group              Unknown





Author







                          Author                    SHANE POWERS

 

                          Organization              [facility]

 

                          Address                   [address] [place]  [address]



 

                          Phone                     [phone]







Care Team Providers







                    Care Team Member Name    Role                Phone

 

                    GIOSHABBIRELA    Unavailable         [phone]







PROBLEMS







          Type      Condition    ICD9-CM Code    ALB45-DW Code    Onset Dates    Condition Status    SNOMED

 Code

 

          Problem    Bipolar I disorder with anxious distress              F31.9               Active    [id number]









ALLERGIES

No Information



ENCOUNTERS







                Encounter       Location        Date            Diagnosis

 

                          [facility]     [id number] [place] [address]

                          18 Sep, 2018              Bipolar I disorder with anxious distress F31.9

 

                          [facility]     [id number] [place] [address]

                          13 Aug, 2018              Bipolar I disorder with anxious distress F31.9

 

                          [facility]     [id number] [place] [address]

                                        Bipolar I disorder with anxious distress F31.9

 

                          [facility]     [id number] [place] [address]

                                        Bipolar I disorder with anxious distress F31.9

 

                          [facility]     [id number] [place] [address]

                                        Bipolar I disorder with anxious distress F31.9

 

                          [facility]     [id number] [place] [address]

                          26 Mar, 2018              Bipolar I disorder with anxious distress F31.9

 

                          [facility]     [id number] [place] [address]

                                        Bipolar I disorder with anxious distress F31.9

 

                          [facility]     [id number] [place] [address]

                                        Bipolar I disorder with anxious distress F31.9

 

                          [facility]     [address] [facility] [id number] [address]

                                        Bipolar I disorder with anxious distress F31.9

 

                          [facility]     [id number] [address]

                          30 Oct, 2017              Bipolar I disorder with anxious distress F31.9

 

                          [facility]     [id number] [address]

                          02 Oct, 2017              Bipolar I disorder with anxious distress F31.9

 

                          [facility]     [id number] [place] [address]

                          08 Aug, 2017              Bipolar I disorder with anxious distress F31.9

 

                          [facility]     [address] [place] [address]

                                        Bipolar I disorder with anxious distress F31.9

 

                          [facility]     [address] [place] [address]

                                        Bipolar II disorder F31.81

 

                          [facility]     [id number] [address]

                                         

 

                          [facility]     [address] [place] [address]

                                        Bipolar II disorder F31.81

 

                          [facility]     [id number] [address]

                          31 May, 2017              Bipolar II disorder F31.81

 

                          [facility]     [id number] [address]

                          17 May, 2017              Bipolar II disorder F31.81

 

                          [facility]     [id number] [address]

                          03 May, 2017              Bipolar II disorder F31.81

 

                          [facility]     [id number] [address]

                                        Bipolar II disorder F31.81

 

                          [facility]     [id number] [address]

                          21 Mar, 2017              Bipolar II disorder F31.81

 

                          [facility]     [id number] [address]

                                        Bipolar II disorder F31.81

 

                          [facility]     [id number] [address]

                          13 Oct, 2016              Bipolar II disorder F31.81

 

                          [facility]     [id number] [address]

                          23 Sep, 2016              Bipolar II disorder F31.81

 

                          [facility]     [id number] [address]

                          15 Sep, 2016              Bipolar II disorder F31.81

 

                          [facility]     [id number] [place] [address]

                          01 Sep, 2016              Bipolar II disorder F31.81

 

                          [facility]     [id number] [address]

                          18 Aug, 2016              Bipolar II disorder F31.81

 

                          [facility]     [id number] [address]

                          04 Aug, 2016              Bipolar II disorder F31.81

 

                          [facility]     [address] [place] [address]

                                        Bipolar II disorder F31.81

 

                          [facility]     [id number] [address]

                                        Bipolar II disorder F31.81

 

                          [facility]     [id number] [address]

                          10 Gary, 2016              Bipolar II disorder F31.81

 

                          [facility]     [id number] [address]

                          25 May, 2016              Bipolar II disorder F31.81

 

                          [facility]     [id number] [address]

                          03 May, 2016              Bipolar II disorder F31.81

 

                          [facility]     [id number] [address]

                                        Bipolar II disorder F31.81

 

                          [facility]     [id number] [address]

                          30 Mar, 2016              Bipolar II disorder F31.81

 

                          [facility]     [id number] [address]

                                        Bipolar II disorder F31.81

 

                          [facility]     [id number] [address]

                                        Bipolar II disorder F31.81

 

                          [facility]     [id number] [address]

                          15 Abdirahman, 2016              Bipolar II disorder F31.81

 

                          [facility]     [id number] [address]

                                        Bipolar II disorder F31.81

 

                          [facility]     [id number] [address]

                          30 Oct, 2015              Bipolar II disorder F31.81

 

                          [facility]     [id number] [address]

                          07 Oct, 2015              Bipolar II disorder F31.81

 

                          [facility]     [id number] [address]

                          22 Sep, 2015              Bipolar II disorder 296.89

 

                          [facility]     [id number] [address]

                          28 Aug, 2015              Bipolar II disorder 296.89







IMMUNIZATIONS

No Known Immunizations



SOCIAL HISTORY

Never Assessed



REASON FOR VISIT

 Follow-up Bipolar Disorder



PLAN OF CARE





VITAL SIGNS





MEDICATIONS

Unknown Medications



RESULTS

No Results



PROCEDURES







                Procedure       Date Ordered    Result          Body Site

 

                [facility] VISIT MENTAL HEALTH ESTAB PT    2018                     

 

                          Psychotherapy, patient &/family, 30 minutes, established patient    2018 

                                                     







INSTRUCTIONS





MEDICATIONS ADMINISTERED

No Known Medications

## 2019-07-16 NOTE — XMS REPORT
[facility]

                             Created on: 10/26/2017



Estrella Rose

External Reference #: [id number]

: 1945

Sex: Female



Demographics







                          Address                   [address]

[place]  [address]

 

                          Preferred Language        Unknown

 

                          Marital Status            Unknown

 

                          Cheondoism Affiliation     Unknown

 

                          Race                      Unknown

 

                          Ethnic Group              Unknown





Author







                          Author                    SHANE POWERS

 

                          [facility]

 

                          Address                   [facility] [place]  [address]



 

                          Phone                     [phone]







Care Team Providers







                    Care Team Member Name    Role                Phone

 

                    SHANE POWERS    Unavailable         [phone]







PROBLEMS







          Type      Condition    ICD9-CM Code    PMO05-QR Code    Onset Dates    Condition Status    SNOMED

 Code

 

          Problem    Bipolar I disorder with anxious distress              F31.9               Active    [id number]









ALLERGIES

No Information



SOCIAL HISTORY

Never Assessed



PLAN OF CARE







                          Activity                  Details









                                         









                          Follow Up                 2 Weeks Reason: Follow-up







VITAL SIGNS





MEDICATIONS

Unknown Medications



RESULTS

No Results



PROCEDURES







                Procedure       Date Ordered    Result          Body Site

 

                [facility] VISIT MENTAL HEALTH ESTAB PT    May 03, 2017                     

 

                    Psychotherapy, patient &/family, 45 minutes, established patient    May 03, 2017        

                                         







IMMUNIZATIONS

No Known Immunizations
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
## 2019-07-16 NOTE — XMS REPORT
[facility]

                             Created on: 2016



Estrella Rose

External Reference #: [id number]

: 1945

Sex: Female



Demographics







                          Address                   [address]

[place]  [address]

 

                          Home Phone                [phone]

 

                          Preferred Language        Unknown

 

                          Marital Status            Unknown

 

                          Buddhism Affiliation     Unknown

 

                          Race                      White

 

                          Ethnic Group              Not  or 





Author







                          Author                    ARELY PIMENTEL

 

                          [facility]              [employer]

 

                          Address                   Unknown

 

                          Phone                     Unavailable







Care Team Providers







                    Care Team Member Name    Role                Phone

 

                    ARELY PIMENTEL    CP                  Unavailable



                                                                



Allergies

          No Known Allergies                                                    
                                   



Problems

          





             Problem Type    Condition    Code         Onset Dates    Condition Status

 

             Assessment    Bipolar II disorder    F31.81                    Active

 

             Problem      Bipolar II disorder    F31.81                    Active



                                                                                
                 



Medications

          No Known Medications                                                  
                                     



Procedures

          





                Procedure       Coding System    Code            Date

 

                Psychotherapy, patient &/family, 45 minutes, established patient    CPT-4           84936           

2016

 

                [facility] VISIT MENTAL HEALTH ESTAB PT    CPT-4                      2016



                                                                                
       



Results

          No Known Results                                                      
             



Summary Purpose

          [employer] Submission

## 2019-07-16 NOTE — XMS REPORT
[facility]

                             Created on: 2018



Estrella Rose

External Reference #: [id number]

: 1945

Sex: Female



Demographics







                          Address                   [address] [place]  [address]

 

                          Preferred Language        Unknown

 

                          Marital Status            Unknown

 

                          Cheondoism Affiliation     Unknown

 

                          Race                      Unknown

 

                          Ethnic Group              Unknown





Author







                          Author                    SHANE POWERS

 

                          Organization              [facility]

 

                          Address                   [address] [place]  [address]



 

                          Phone                     [phone]







Care Team Providers







                    Care Team Member Name    Role                Phone

 

                    GIOSHABBIRELA    Unavailable         [phone]







PROBLEMS







          Type      Condition    ICD9-CM Code    SDN28-ZW Code    Onset Dates    Condition Status    SNOMED

 Code

 

          Problem    Bipolar I disorder with anxious distress              F31.9               Active    092340333









ALLERGIES

No Information



ENCOUNTERS







                Encounter       Location        Date            Diagnosis

 

                          [place] [id number] [place] [address]

                                         

 

                          [place] [id number] [place] [address]

                                        Bipolar I disorder with anxious distress F31.9

 

                          [facility]     [id number] [place] [address]

                                        Bipolar I disorder with anxious distress F31.9

 

                          [facility]     [id number] [place] [address]

                          26 Mar, 2018              Bipolar I disorder with anxious distress F31.9

 

                          [facility]     [id number] [place] [address]

                                        Bipolar I disorder with anxious distress F31.9

 

                          [facility]     [id number] [place] [address]

                                        Bipolar I disorder with anxious distress F31.9

 

                          [facility]     [id number] [place] [address]

                                        Bipolar I disorder with anxious distress F31.9

 

                          [facility]     [id number] [place] [address]

                          30 Oct, 2017              Bipolar I disorder with anxious distress F31.9

 

                          [facility]     [id number] [address]

                          02 Oct, 2017              Bipolar I disorder with anxious distress F31.9

 

                          [facility]     [address] [facility] [address] [id number] [place] [address]

                          08 Aug, 2017              Bipolar I disorder with anxious distress F31.9

 

                          [facility]     [id number] [place] [address]

                                        Bipolar I disorder with anxious distress F31.9

 

                          [facility]     [address] [facility] [id number] [address]

                                        Bipolar II disorder F31.81

 

                          [facility]     [id number] [place] [address]

                                         

 

                          [facility]     [address] [facility] [id number] [address]

                                        Bipolar II disorder F31.81

 

                          [facility]     [address] [place] [address]

                          31 May, 2017              Bipolar II disorder F31.81

 

                          [facility]     [id number] [address]

                          17 May, 2017              Bipolar II disorder F31.81

 

                          [facility]     [id number] [address]

                          03 May, 2017              Bipolar II disorder F31.81

 

                          [facility]     [id number] [address]

                                        Bipolar II disorder F31.81

 

                          [facility]     [id number] [address]

                          21 Mar, 2017              Bipolar II disorder F31.81

 

                          [facility]     [id number] [address]

                                        Bipolar II disorder F31.81

 

                          [facility]     [id number] [address]

                          13 Oct, 2016              Bipolar II disorder F31.81

 

                          [facility]     [address] [facility] [id number] [address]

                          23 Sep, 2016              Bipolar II disorder F31.81

 

                          [facility]     [address] [facility] [id number] [place] [address]

                          15 Sep, 2016              Bipolar II disorder F31.81

 

                          [facility]     [id number] [place] [address]

                          01 Sep, 2016              Bipolar II disorder F31.81

 

                          [facility]     [id number] [place] [address]

                          18 Aug, 2016              Bipolar II disorder F31.81

 

                          [facility]     [id number] [place] [address], [place] [address]

                          04 Aug, 2016              Bipolar II disorder F31.81

 

                          [facility]     [id number] [place] [address]

                                        Bipolar II disorder F31.81

 

                          [facility]     [id number] [address]

                                        Bipolar II disorder F31.81

 

                          [facility]     [address] [place] [address]

                          10 Gary, 2016              Bipolar II disorder F31.81

 

                          [facility]     [id number] [address]

                          25 May, 2016              Bipolar II disorder F31.81

 

                          [facility]     [id number] [address]

                          03 May, 2016              Bipolar II disorder F31.81

 

                          [facility]     [id number] [address]

                                        Bipolar II disorder F31.81

 

                          [facility]     [id number] [address]

                          30 Mar, 2016              Bipolar II disorder F31.81

 

                          [facility]     [id number] [address]

                                        Bipolar II disorder F31.81

 

                          [facility]     [id number] [address]

                          08 2016              Bipolar II disorder F31.81

 

                          [facility]     [id number] [address]

                          15 Abdirahman, 2016              Bipolar II disorder F31.81

 

                          [facility]     [id number] [address]

                                        Bipolar II disorder F31.81

 

                          [facility]     [id number] [address]

                          30 Oct, 2015              Bipolar II disorder F31.81

 

                          [facility]     [address] [place] [address]

                          07 Oct, 2015              Bipolar II disorder F31.81

 

                          [facility]     [id number] [address]

                          22 Sep, 2015              Bipolar II disorder 296.89

 

                          [facility]     [id number] [address]

                          28 Aug, 2015              Bipolar II disorder 296.89







IMMUNIZATIONS

No Known Immunizations



SOCIAL HISTORY

Never Assessed



REASON FOR VISIT

 Follow-up Bipolar Disorder



PLAN OF CARE







                          Activity                  Details









                                         









                          Follow Up                 Next available Reason: Follow-up







VITAL SIGNS





MEDICATIONS

Unknown Medications



RESULTS

No Results



PROCEDURES







                Procedure       Date Ordered    Result          Body Site

 

                [facility] VISIT MENTAL HEALTH ESTAB PT    2018                     

 

                    Psychotherapy, patient &/family, 30 minutes, established patient    2018        

                                         







INSTRUCTIONS





MEDICATIONS ADMINISTERED

No Known Medications

## 2019-07-16 NOTE — XMS REPORT
[facility]

                             Created on: 2018



Estrella Rose

External Reference #: [id number]

: 1945

Sex: Female



Demographics







                          Address                   [address] [place]  [address]

 

                          Preferred Language        Unknown

 

                          Marital Status            Unknown

 

                          Evangelical Affiliation     Unknown

 

                          Race                      Unknown

 

                          Ethnic Group              Unknown





Author







                          Author                    SHANE POWERS

 

                          Organization              [facility]

 

                          Address                   [address] [place]  [address]



 

                          Phone                     [phone]







Care Team Providers







                    Care Team Member Name    Role                Phone

 

                    GIOSHABBIRELA    Unavailable         [phone]







PROBLEMS







          Type      Condition    ICD9-CM Code    YVI94-WV Code    Onset Dates    Condition Status    SNOMED

 Code

 

          Problem    Bipolar I disorder with anxious distress              F31.9               Active    [id number]









ALLERGIES

No Information



ENCOUNTERS







                Encounter       Location        Date            Diagnosis

 

                          [facility]     [id number] [place] [address]

                          18 Sep, 2018              Bipolar I disorder with anxious distress F31.9

 

                          [facility]     [id number] [place] [address]

                          13 Aug, 2018              Bipolar I disorder with anxious distress F31.9

 

                          [facility]     [id number] [place] [address]

                                        Bipolar I disorder with anxious distress F31.9

 

                          [facility]     [id number] [place] [address]

                                        Bipolar I disorder with anxious distress F31.9

 

                          [facility]     [id number] [place] [address]

                                        Bipolar I disorder with anxious distress F31.9

 

                          [facility]     [id number] [place] [address]

                          26 Mar, 2018              Bipolar I disorder with anxious distress F31.9

 

                          [facility]     [id number] [place] [address]

                                        Bipolar I disorder with anxious distress F31.9

 

                          [facility]     [id number] [place] [address]

                                        Bipolar I disorder with anxious distress F31.9

 

                          [facility]     [address] [facility] [id number] [address]

                                        Bipolar I disorder with anxious distress F31.9

 

                          [facility]     [id number] [address]

                          30 Oct, 2017              Bipolar I disorder with anxious distress F31.9

 

                          [facility]     [id number], [place] [address]

                          02 Oct, 2017              Bipolar I disorder with anxious distress F31.9

 

                          [facility]     [id number] [place] [address]

                          08 Aug, 2017              Bipolar I disorder with anxious distress F31.9

 

                          [facility]     [address] [place] [address]

                                        Bipolar I disorder with anxious distress F31.9

 

                          [facility]     [address] [place] [address]

                                        Bipolar II disorder F31.81

 

                          [facility]     [id number] [address]

                                         

 

                          [facility]     [address] [place] [address]

                                        Bipolar II disorder F31.81

 

                          [facility]     [id number], [place] [address]

                          31 May, 2017              Bipolar II disorder F31.81

 

                          [facility]     [id number], [place] [address]

                          17 May, 2017              Bipolar II disorder F31.81

 

                          [facility]     [id number], [place] [address]

                          03 May, 2017              Bipolar II disorder F31.81

 

                          [facility]     [id number], [place] [address]

                                        Bipolar II disorder F31.81

 

                          [facility]     [id number], [place] [address]

                          21 Mar, 2017              Bipolar II disorder F31.81

 

                          [facility]     [id number], [place] [address]

                                        Bipolar II disorder F31.81

 

                          [facility]     [id number], [place] [address]

                          13 Oct, 2016              Bipolar II disorder F31.81

 

                          [facility]     [id number], [place] [address]

                          23 Sep, 2016              Bipolar II disorder F31.81

 

                          [facility]     [id number], [place] [address]

                          15 Sep, 2016              Bipolar II disorder F31.81

 

                          [facility]     [id number] [place] [address]

                          01 Sep, 2016              Bipolar II disorder F31.81

 

                          [facility]     [id number], [place] [address]

                          18 Aug, 2016              Bipolar II disorder F31.81

 

                          [facility]     [id number], [place] [address]

                          04 Aug, 2016              Bipolar II disorder F31.81

 

                          [facility]     [address] [place] [address]

                                        Bipolar II disorder F31.81

 

                          [facility]     [id number], [place] [address]

                                        Bipolar II disorder F31.81

 

                          [facility]     [id number], [place] [address]

                          10 Gary, 2016              Bipolar II disorder F31.81

 

                          [facility]     [id number], [place] [address]

                          25 May, 2016              Bipolar II disorder F31.81

 

                          [facility]     [id number], [place] [address]

                          03 May, 2016              Bipolar II disorder F31.81

 

                          [facility]     [address], [place] [address]

                                        Bipolar II disorder F31.81

 

                          [facility]     [id number], [place] [address]

                          30 Mar, 2016              Bipolar II disorder F31.81

 

                          [facility]     [id number], [place] [address]

                                        Bipolar II disorder F31.81

 

                          [facility]     [id number], [place] [address]

                                        Bipolar II disorder F31.81

 

                          [facility]     [id number], [place] [address]

                          15 Abdirahman, 2016              Bipolar II disorder F31.81

 

                          [facility]     [id number], [place] [address]

                                        Bipolar II disorder F31.81

 

                          [facility]     [address], [place] [address]

                          30 Oct, 2015              Bipolar II disorder F31.81

 

                          [facility]     [id number], [place] [address]

                          07 Oct, 2015              Bipolar II disorder F31.81

 

                          [facility]     [address], [place] [address]

                          22 Sep, 2015              Bipolar II disorder 296.89

 

                          [facility]     [id number], [place] [address]

                          28 Aug, 2015              Bipolar II disorder 296.89







IMMUNIZATIONS

No Known Immunizations



SOCIAL HISTORY

Never Assessed



REASON FOR VISIT

 Follow-up Bipolar Disorder



PLAN OF CARE







                          Activity                  Details









                                         









                          Follow Up                 Next available Reason: Follow-up







VITAL SIGNS





MEDICATIONS

Unknown Medications



RESULTS

No Results



PROCEDURES







                Procedure       Date Ordered    Result          Body Site

 

                [facility] VISIT MENTAL HEALTH ESTAB PT    Aug 13, 2018                     

 

                    Psychotherapy, patient &/family, 30 minutes, established patient    Aug 13, 2018        

                                         







INSTRUCTIONS





MEDICATIONS ADMINISTERED

No Known Medications

## 2019-07-16 NOTE — XMS REPORT
[facility]

                             Created on: 2018



Estrella Rose

External Reference #: [id number]

: 1945

Sex: Female



Demographics







                          Address                   [address] [place]  [address]

 

                          Preferred Language        Unknown

 

                          Marital Status            Unknown

 

                          Advent Affiliation     Unknown

 

                          Race                      Unknown

 

                          Ethnic Group              Unknown





Author







                          Author                    SHANE POWERS

 

                          Organization              [facility]

 

                          Address                   [address] [place]  [address]



 

                          Phone                     [phone]







Care Team Providers







                    Care Team Member Name    Role                Phone

 

                    GIOSHABBIRELA    Unavailable         [phone]







PROBLEMS







          Type      Condition    ICD9-CM Code    LLO00-DM Code    Onset Dates    Condition Status    SNOMED

 Code

 

          Problem    Bipolar I disorder with anxious distress              F31.9               Active    [id number]









ALLERGIES

No Information



ENCOUNTERS







                Encounter       Location        Date            Diagnosis

 

                          [facility]     [id number] [place] [address]

                                         

 

                          [address] [id number] [place] [address]

                                        Bipolar I disorder with anxious distress F31.9

 

                          [facility]     [id number] [place] [address]

                          26 Mar, 2018              Bipolar I disorder with anxious distress F31.9

 

                          [facility]     [id number] [place] [address]

                                        Bipolar I disorder with anxious distress F31.9

 

                          [facility]     [id number] [place] [address]

                                        Bipolar I disorder with anxious distress F31.9

 

                          [facility]     [id number] [place] [address]

                                        Bipolar I disorder with anxious distress F31.9

 

                          [facility]     [id number] [place] [address]

                          30 Oct, 2017              Bipolar I disorder with anxious distress F31.9

 

                          [facility]     [id number] [place] [address]

                          02 Oct, 2017              Bipolar I disorder with anxious distress F31.9

 

                          [facility]     [id number] [address]

                          08 Aug, 2017              Bipolar I disorder with anxious distress F31.9

 

                          [facility]     [id number] [address]

                                        Bipolar I disorder with anxious distress F31.9

 

                          [facility]     [id number] [address]

                                        Bipolar II disorder F31.81

 

                          [facility]     [id number] [address]

                                         

 

                          [facility]     [id number] [place] [address]

                                        Bipolar II disorder F31.81

 

                          [facility]     [id number] [address]

                          31 May, 2017              Bipolar II disorder F31.81

 

                          [facility]     [id number] [address]

                          17 May, 2017              Bipolar II disorder F31.81

 

                          [facility]     [address] [place] [address]

                          03 May, 2017              Bipolar II disorder F31.81

 

                          [facility]     [id number] [address]

                                        Bipolar II disorder F31.81

 

                          [facility]     [id number] [address]

                          21 Mar, 2017              Bipolar II disorder F31.81

 

                          [facility]     [id number] [address]

                                        Bipolar II disorder F31.81

 

                          [facility]     [id number] [address]

                          13 Oct, 2016              Bipolar II disorder F31.81

 

                          [facility]     [id number] [address]

                          23 Sep, 2016              Bipolar II disorder F31.81

 

                          [facility]     [id number] [address]

                          15 Sep, 2016              Bipolar II disorder F31.81

 

                          [facility]     [id number] [place] [address]

                          01 Sep, 2016              Bipolar II disorder F31.81

 

                          [facility]     [id number] [place] [address]

                          18 Aug, 2016              Bipolar II disorder F31.81

 

                          [facility]     [id number] [place] [address]

                          04 Aug, 2016              Bipolar II disorder F31.81

 

                          [facility]     [id number] [place] [address], [place] [address]

                                        Bipolar II disorder F31.81

 

                          [facility]     [id number] [address]

                                        Bipolar II disorder F31.81

 

                          [facility]     [id number] [place] [address]

                          10 Gary, 2016              Bipolar II disorder F31.81

 

                          [facility]     [id number] [place] [address]

                          25 May, 2016              Bipolar II disorder F31.81

 

                          [facility]     [id number] [address]

                          03 May, 2016              Bipolar II disorder F31.81

 

                          [facility]     [id number] [address]

                                        Bipolar II disorder F31.81

 

                          [facility]     [id number] [address]

                          30 Mar, 2016              Bipolar II disorder F31.81

 

                          [facility]     [id number] [address]

                                        Bipolar II disorder F31.81

 

                          [facility]     [id number] [address]

                                        Bipolar II disorder F31.81

 

                          [facility]     [id number] [address]

                          15 Abdirahman, 2016              Bipolar II disorder F31.81

 

                          [facility]     [id number] [address]

                                        Bipolar II disorder F31.81

 

                          [facility]     [id number] [address]

                          30 Oct, 2015              Bipolar II disorder F31.81

 

                          [facility]     [id number] [address]

                          07 Oct, 2015              Bipolar II disorder F31.81

 

                          [facility]     [id number] [address]

                          22 Sep, 2015              Bipolar II disorder 296.89

 

                          [facility]     [id number] [address]

                          28 Aug, 2015              Bipolar II disorder 296.89







IMMUNIZATIONS

No Known Immunizations



SOCIAL HISTORY

Never Assessed



REASON FOR VISIT

 Follow-up Bipolar Disorder



PLAN OF CARE







                          Activity                  Details









                                         









                          Follow Up                 Next available Reason: Follow-up







VITAL SIGNS





MEDICATIONS







        Medication    Instructions    Dosage    Frequency    Start Date    End Date    Duration    Status



 

        Lisinopril                                                    Active

 

        Omeprazole                                                    Active

 

        Simvastatin                                                    Active

 

        Venlafaxine HCl ER                                                    Active

 

        Namenda                                                    Active

 

        Colace                                                    Active

 

        Latanoprost                                                    Active

 

        Lorazepam                                                    Active

 

        BusPIRone HCl                                                    Active

 

        Lamotrigine                                                    Active







RESULTS

No Results



PROCEDURES







                Procedure       Date Ordered    Result          Body Site

 

                [facility] VISIT MENTAL HEALTH ESTAB PT    2017                     

 

                    Psychotherapy, patient &/family, 45 minutes, established patient    2017        

                                         







INSTRUCTIONS





MEDICATIONS ADMINISTERED

No Known Medications

## 2019-07-16 NOTE — XMS REPORT
Continuity of Care Document

                             Created on: 2019



GRACY OSEGUERA

External Reference #: [id number]

: 1945

Sex: Female



Demographics







                          Address                   [address]  [address]

 

                          Home Phone                [phone] x

 

                          Preferred Language        Unknown

 

                          Marital Status            Unknown

 

                          Rastafarian Affiliation     Unknown

 

                          Race                      Unknown

 

                          Ethnic Group              Unknown





Author







                          Organization              Unknown

 

                          Address                   Unknown

 

                          Phone                     [phone]



              



Allergies

      





             Active              Description              Code              Type              Severity

                Reaction              Onset              Reported/Identified              Relationship

 to Patient                             Clinical Status        

 

             Yes              FLEXARIL              FLEXARIL                             Unknown     

             N/A                             2010                                      

 

                Yes              hydrocodone              [id number]              Drug Allergy        

             Unknown              N/A                             2010                      

                                                 

 

           Yes              TAPE              TAPE                             Unknown              N/A

                                2010                                       

 

                Yes              Tetanus Vaccines   Toxoid              [id number]              Drug Allergy

              Unknown              N/A                             2010              

                                                 

 

                Yes              Tetanus Vaccines and Toxoid              [id number]              Drug

 Allergy              Unknown              N/A                              2010    

                                                             



                          



Medications

      



There is no data.                  



Problems

      





             Date Dx Coded              Attending              Type              Code              Diagnosis

                                        Diagnosed By        

 

                2015              SHERINE CRUZ DO              Ot              294.9          

                                                             

 

                2015              SHERINE JEAN DO              Ot              V76.12       

                                                             

 

                10/13/2017              SHERINE JEAN DO              Ot              Z12.31       

                          ENCNTR SCREEN MAMMOGRAM FOR MALIGNANT NE                       

 

                2017              SHERINE JEAN DO              Ot              Z12.31       

                          ENCNTR SCREEN MAMMOGRAM FOR MALIGNANT NE                       

 

                2018              SHERINE JEAN DO              Ot              R92.8        

                          OTH ABN AND INCONCLUSIVE FINDINGS ON DX                        

 

                2018              SHERINE JEAN DO              Ot              Z12.31       

                          ENCNTR SCREEN MAMMOGRAM FOR MALIGNANT NE                       

 

                2018              SHERINE JEAN DO              Ot              R92.8        

                          OTH ABN AND INCONCLUSIVE FINDINGS ON DX                        

 

                2018              SHERINE JEAN DO              Ot              Z12.31       

                          ENCNTR SCREEN MAMMOGRAM FOR MALIGNANT NE                       

 

                2018              SHERINE JEAN DO              Ot              R92.2        

                          INCONCLUSIVE MAMMOGRAM                       

 

                2018              SHERINE JEAN DO              Ot              R92.2        

                          INCONCLUSIVE MAMMOGRAM                       

 

                2019              SHERINE JEAN DO              Ot              R92.2        

                          INCONCLUSIVE MAMMOGRAM                       



                                      



Procedures

      



There is no data.                  



Results

      



There is no data.              



Encounters

      





                ACCT No.              Visit Date/Time              Discharge              Status      

                Pt. Type              Provider              Facility              Loc./Unit      

                                        Complaint        

 

                    [id number]              2018 14:03:00              2018 23:59:59      

                CLS              Outpatient              SHERINE JEAN DO              [facility]              RAD                       ABNORMAL MAMMOGRAM        

 

                    [id number]              2018 15:50:00              2018 23:59:59      

                CLS              Preadmit              SHERINE JEAN DO              [facility]              RAD                       ABNORMAL MAMMO        

 

                    [id number]              10/31/2018 10:35:00              10/31/2018 23:59:59      

                CLS              Outpatient              SHERINE JEAN DO              [facility]              RAD                       SCREENING        

 

                    [id number]              10/12/2017 10:35:00              10/12/2017 23:59:59      

                CLS              Outpatient              SHERINE JEAN DO              [facility]              RAD                       SCREENING Z12.31        

 

                    [id number]              2015 10:03:00              2015 23:59:59      

                CLS              Outpatient              SHERINE JEAN DO              [facility]              RAD                                

 

                    [id number]              2014 14:22:00              2014 23:59:59      

                CLS              Outpatient              SHERINE CRUZ DO              [facility]              RAD                                

 

                    [id number]              2014 09:56:00              2014 23:59:59      

             CLS              Outpatient

## 2019-07-16 NOTE — XMS REPORT
[facility]

                             Created on: 10/07/2015



Estrella Rose

External Reference #: [id number]

: 1945

Sex: Female



Demographics







                          Address                   [address]

[place]  [address]

 

                          Home Phone                [phone]

 

                          Preferred Language        Unknown

 

                          Marital Status            Unknown

 

                          Denominational Affiliation     Unknown

 

                          Race                      White

 

                          Ethnic Group              Not  or 





Author







                          Author                    ARELY PIMENTEL

 

                          [facility]              [employer]

 

                          Address                   Unknown

 

                          Phone                     Unavailable







Care Team Providers







                    Care Team Member Name    Role                Phone

 

                    ARELY PIMENTEL    CP                  Unavailable



                                                                



Allergies

          No Known Allergies                                                    
                                   



Problems

          





             Problem Type    Condition    Code         Onset Dates    Condition Status

 

             Assessment    Bipolar II disorder    F31.81                    Active

 

             Problem      Bipolar II disorder    F31.81                    Active



                                                                                
                 



Medications

          No Known Medications                                                  
                                     



Procedures

          





                Procedure       Coding System    Code            Date

 

                Psychotherapy, patient &/family, 45 minutes, established patient    CPT-4           16834           

Oct 07, 2015

 

                [facility] VISIT MENTAL HEALTH ESTAB PT    CPT-4                      Oct 07, 2015



                                                                                
       



Results

          No Known Results                                                      
             



Summary Purpose

          [employer] Submission
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
## 2019-07-16 NOTE — XMS REPORT
[facility]

                             Created on: 2018



Estrella Rose

External Reference #: [id number]

: 1945

Sex: Female



Demographics







                          Address                   [address] [place]  [address]

 

                          Preferred Language        Unknown

 

                          Marital Status            Unknown

 

                          Yarsani Affiliation     Unknown

 

                          Race                      Unknown

 

                          Ethnic Group              Unknown





Author







                          Author                    SHANE POWERS

 

                          Organization              [facility]

 

                          Address                   [address] [place]  [address]



 

                          Phone                     [phone]







Care Team Providers







                    Care Team Member Name    Role                Phone

 

                    GIOSHABBIRELA    Unavailable         [phone]







PROBLEMS







          Type      Condition    ICD9-CM Code    RYU78-VQ Code    Onset Dates    Condition Status    SNOMED

 Code

 

          Problem    Bipolar I disorder with anxious distress              F31.9               Active    [id number]









ALLERGIES

No Information



ENCOUNTERS







                Encounter       Location        Date            Diagnosis

 

                          [facility]     [id number] [place] [id number]

                                         

 

                          [place] [id number] [place] [address]

                                        Bipolar I disorder with anxious distress F31.9

 

                          [facility]     [id number] [place] [address]

                          26 Mar, 2018              Bipolar I disorder with anxious distress F31.9

 

                          [facility]     [id number] [place] [address]

                                        Bipolar I disorder with anxious distress F31.9

 

                          [facility]     [id number] [place] [address]

                                        Bipolar I disorder with anxious distress F31.9

 

                          [facility]     [id number] [place] [address]

                                        Bipolar I disorder with anxious distress F31.9

 

                          [facility]     [id number] [place] [address]

                          30 Oct, 2017              Bipolar I disorder with anxious distress F31.9

 

                          [facility]     [id number] [place] [address]

                          02 Oct, 2017              Bipolar I disorder with anxious distress F31.9

 

                          [facility]     [id number] [address]

                          08 Aug, 2017              Bipolar I disorder with anxious distress F31.9

 

                          [facility]     [id number] [address]

                                        Bipolar I disorder with anxious distress F31.9

 

                          [facility]     [id number] [address]

                                        Bipolar II disorder F31.81

 

                          [facility]     [id number] [address]

                                         

 

                          [facility]     [id number] [place] [address]

                                        Bipolar II disorder F31.81

 

                          [facility]     [id number] [address]

                          31 May, 2017              Bipolar II disorder F31.81

 

                          [facility]     [id number] [address]

                          17 May, 2017              Bipolar II disorder F31.81

 

                          [facility]     [address] [place] [address]

                          03 May, 2017              Bipolar II disorder F31.81

 

                          [facility]     [id number] [address]

                                        Bipolar II disorder F31.81

 

                          [facility]     [id number] [address]

                          21 Mar, 2017              Bipolar II disorder F31.81

 

                          [facility]     [id number] [address]

                                        Bipolar II disorder F31.81

 

                          [facility]     [id number] [address]

                          13 Oct, 2016              Bipolar II disorder F31.81

 

                          [facility]     [id number] [address]

                          23 Sep, 2016              Bipolar II disorder F31.81

 

                          [facility]     [id number] [address]

                          15 Sep, 2016              Bipolar II disorder F31.81

 

                          [facility]     [address] [place] [address]

                          01 Sep, 2016              Bipolar II disorder F31.81

 

                          [facility]     [id number] [place] [address]

                          18 Aug, 2016              Bipolar II disorder F31.81

 

                          [facility]     [address] [place] [address]

                          04 Aug, 2016              Bipolar II disorder F31.81

 

                          [facility]     [address] [place] [address], [place] [address]

                                        Bipolar II disorder F31.81

 

                          [facility]     [id number] [address]

                                        Bipolar II disorder F31.81

 

                          [facility]     [address] [place] [address]

                          10 Gary, 2016              Bipolar II disorder F31.81

 

                          [facility]     [address] [place] [address]

                          25 May, 2016              Bipolar II disorder F31.81

 

                          [facility]     [id number] [address]

                          03 May, 2016              Bipolar II disorder F31.81

 

                          [facility]     [id number] [address]

                                        Bipolar II disorder F31.81

 

                          [facility]     [id number] [address]

                          30 Mar, 2016              Bipolar II disorder F31.81

 

                          [facility]     [id number] [address]

                                        Bipolar II disorder F31.81

 

                          [facility]     [id number] [address]

                                        Bipolar II disorder F31.81

 

                          [facility]     [id number] [address]

                          15 Abdirahman, 2016              Bipolar II disorder F31.81

 

                          [facility]     [id number] [address]

                                        Bipolar II disorder F31.81

 

                          [facility]     [id number] [address]

                          30 Oct, 2015              Bipolar II disorder F31.81

 

                          [facility]     [address] [address]

                          07 Oct, 2015              Bipolar II disorder F31.81

 

                          [facility]     [id number] [address]

                          22 Sep, 2015              Bipolar II disorder 296.89

 

                          [facility]     [id number] [address]

                          28 Aug, 2015              Bipolar II disorder 296.89







IMMUNIZATIONS

No Known Immunizations



SOCIAL HISTORY

Never Assessed



REASON FOR VISIT

 Follow-up Bipolar Disorder



PLAN OF CARE







                          Activity                  Details









                                         









                          Follow Up                 Next available Reason: Follow-up







VITAL SIGNS





MEDICATIONS

Unknown Medications



RESULTS

No Results



PROCEDURES







                Procedure       Date Ordered    Result          Body Site

 

                [facility] VISIT MENTAL HEALTH ESTAB PT    Oct 30, 2017                     

 

                    Psychotherapy, patient &/family, 45 minutes, established patient    Oct 30, 2017        

                                         







INSTRUCTIONS





MEDICATIONS ADMINISTERED

No Known Medications

## 2019-07-16 NOTE — XMS REPORT
[facility]

                             Created on: 2016



Estrella Rose

External Reference #: [id number]

: 1945

Sex: Female



Demographics







                          Address                   [address]

[place]  [address]

 

                          Home Phone                [phone]

 

                          Preferred Language        Unknown

 

                          Marital Status            Unknown

 

                          Congregation Affiliation     Unknown

 

                          Race                      White

 

                          Ethnic Group              Not  or 





Author







                          Author                    ARELY PIMENTEL

 

                          [facility]              eClinical[employer]

 

                          Address                   Unknown

 

                          Phone                     Unavailable







Care Team Providers







                    Care Team Member Name    Role                Phone

 

                    ARELY PIMENTEL    CP                  Unavailable



                                                                



Allergies

          No Known Allergies                                                    
                                   



Problems

          





             Problem Type    Condition    Code         Onset Dates    Condition Status

 

             Assessment    Bipolar II disorder    F31.81                    Active

 

             Problem      Bipolar II disorder    F31.81                    Active



                                                                                
                 



Medications

          No Known Medications                                                  
                                     



Procedures

          





                Procedure       Coding System    Code            Date

 

                Psychotherapy, patient &/family, 45 minutes, established patient    CPT-4           91484           

May 03, 2016

 

                [facility] VISIT MENTAL HEALTH ESTAB PT    CPT-4                      May 03, 2016



                                                                                
       



Results

          No Known Results                                                      
             



Summary Purpose

          eClinicalWorks Submission

## 2019-07-16 NOTE — XMS REPORT
[facility]

                             Created on: 10/30/2015



Estrella Rose

External Reference #: [id number]

: 1945

Sex: Female



Demographics







                          Address                   [address]

[place]  [address]

 

                          Home Phone                [phone]

 

                          Preferred Language        Unknown

 

                          Marital Status            Unknown

 

                          Faith Affiliation     Unknown

 

                          Race                      White

 

                          Ethnic Group              Not  or 





Author







                          Author                    ARELY PIMENTEL

 

                          [facility]              [employer]

 

                          Address                   Unknown

 

                          Phone                     Unavailable







Care Team Providers







                    Care Team Member Name    Role                Phone

 

                    ARELY PIMENTLE    CP                  Unavailable



                                                                



Allergies

          No Known Allergies                                                    
                                   



Problems

          





             Problem Type    Condition    Code         Onset Dates    Condition Status

 

             Assessment    Bipolar II disorder    F31.81                    Active

 

             Problem      Bipolar II disorder    F31.81                    Active



                                                                                
                 



Medications

          No Known Medications                                                  
                                     



Procedures

          





                Procedure       Coding System    Code            Date

 

                Psychotherapy, patient &/family, 45 minutes, established patient    CPT-4           21483           

Oct 30, 2015

 

                [facility] VISIT MENTAL HEALTH ESTAB PT    CPT-4                      Oct 30, 2015



                                                                                
       



Results

          No Known Results                                                      
             



Summary Purpose

          eClinicalWorks Submission

## 2019-07-16 NOTE — XMS REPORT
[facility]

                             Created on: 2016



Estrella Rose

External Reference #: [id number]

: 1945

Sex: Female



Demographics







                          Address                   [address]

[place]  [address]

 

                          Home Phone                [phone]

 

                          Preferred Language        Unknown

 

                          Marital Status            Unknown

 

                          Orthodoxy Affiliation     Unknown

 

                          Race                      White

 

                          Ethnic Group              Not  or 





Author







                          Author                    ARELY PIMENTEL

 

                          [facility]              [employer]

 

                          Address                   Unknown

 

                          Phone                     Unavailable







Care Team Providers







                    Care Team Member Name    Role                Phone

 

                    ARELY PIMENTEL    CP                  Unavailable



                                                                



Allergies

          No Known Allergies                                                    
                                   



Problems

          





             Problem Type    Condition    Code         Onset Dates    Condition Status

 

             Assessment    Bipolar II disorder    F31.81                    Active

 

             Problem      Bipolar II disorder    F31.81                    Active



                                                                                
                 



Medications

          No Known Medications                                                  
                                     



Procedures

          





                Procedure       Coding System    Code            Date

 

                Psychotherapy, patient &/family, 45 minutes, established patient    CPT-4           07088           

Abdirahman 15, 2016

 

                [facility] VISIT MENTAL HEALTH ESTAB PT    CPT-4                      Abdirahman 15, 2016



                                                                                
       



Results

          No Known Results                                                      
             



Summary Purpose

          eClinicalWorks Submission

## 2019-07-16 NOTE — XMS REPORT
[facility]

                             Created on: 2018



Estrella Rose

External Reference #: [id number]

: 1945

Sex: Female



Demographics







                          Address                   [address] [place]  [address]

 

                          Preferred Language        Unknown

 

                          Marital Status            Unknown

 

                          Rastafari Affiliation     Unknown

 

                          Race                      Unknown

 

                          Ethnic Group              Unknown





Author







                          Author                    SHANE POWERS

 

                          Organization              [facility] [place]

 

                          Address                   [address] [place]  [address]



 

                          Phone                     [phone]







Care Team Providers







                    Care Team Member Name    Role                Phone

 

                    GIOSHABBRIELA    Unavailable         [phone]







PROBLEMS







          Type      Condition    ICD9-CM Code    ZHA45-WI Code    Onset Dates    Condition Status    SNOMED

 Code

 

          Problem    Bipolar I disorder with anxious distress              F31.9               Active    [id number]









ALLERGIES

No Information



ENCOUNTERS







                Encounter       Location        Date            Diagnosis

 

                          [facility] [place]     [id number] [place] [address]

                          18 Sep, 2018               

 

                          [place] [id number] [place] [address]

                          13 Aug, 2018              Bipolar I disorder with anxious distress F31.9

 

                          [facility] [place]     [id number] [place] [address]

                                        Bipolar I disorder with anxious distress F31.9

 

                          [facility] [place]     [id number] [place] [address]

                                        Bipolar I disorder with anxious distress F31.9

 

                          [facility] [place]     [id number] [place] [address]

                                        Bipolar I disorder with anxious distress F31.9

 

                          [facility] [place]     [id number] [place] [address]

                          26 Mar, 2018              Bipolar I disorder with anxious distress F31.9

 

                          [facility] [place]     [id number] [place] [address]

                                        Bipolar I disorder with anxious distress F31.9

 

                          [facility] [place]     [id number] [place] [address]

                                        Bipolar I disorder with anxious distress F31.9

 

                          [facility] [place]     [id number] [address]

                                        Bipolar I disorder with anxious distress F31.9

 

                          [facility] [place]     [id number] [address]

                          30 Oct, 2017              Bipolar I disorder with anxious distress F31.9

 

                          [facility] [place]     [id number] [address]

                          02 Oct, 2017              Bipolar I disorder with anxious distress F31.9

 

                          [facility] [place]     [id number] [address]

                          08 Aug, 2017              Bipolar I disorder with anxious distress F31.9

 

                          [facility] [place]     [address] [place] [address]

                                        Bipolar I disorder with anxious distress F31.9

 

                          [facility] [place]     [address] [address]

                                        Bipolar II disorder F31.81

 

                          [facility] [place]     [id number] [address]

                                         

 

                          [facility] [place]     [address] [place] [address]

                                        Bipolar II disorder F31.81

 

                          [facility] [place]     [address] [address]

                          31 May, 2017              Bipolar II disorder F31.81

 

                          [facility] [place]     [address] [address]

                          17 May, 2017              Bipolar II disorder F31.81

 

                          [facility] [place]     [address] [address]

                          03 May, 2017              Bipolar II disorder F31.81

 

                          [facility] [place]     [address] [address]

                                        Bipolar II disorder F31.81

 

                          [facility] [place]     [address] [address]

                          21 Mar, 2017              Bipolar II disorder F31.81

 

                          [facility] [place]     [address] [facility] [id number] [address]

                                        Bipolar II disorder F31.81

 

                          [facility] [place]     [address] [facility] [id number] [address]

                          13 Oct, 2016              Bipolar II disorder F31.81

 

                          [facility] [place]     [id number] [address]

                          23 Sep, 2016              Bipolar II disorder F31.81

 

                          [facility] [place]     [address] [facility] [id number] [place] [address]

                          15 Sep, 2016              Bipolar II disorder F31.81

 

                          [facility] [place]     [address] [facility] [id number] [place] [address]

                          01 Sep, 2016              Bipolar II disorder F31.81

 

                          [facility] [place]     [address] [address]

                          18 Aug, 2016              Bipolar II disorder F31.81

 

                          [facility] [place]     [address] [address]

                          04 Aug, 2016              Bipolar II disorder F31.81

 

                          [facility] [place]     [id number] [place] [address]

                                        Bipolar II disorder F31.81

 

                          [facility] [place]     [address] [address]

                                        Bipolar II disorder F31.81

 

                          [facility] [place]     [address] [address]

                          10 Gary, 2016              Bipolar II disorder F31.81

 

                          [facility] [place]     [id number] [address]

                          25 May, 2016              Bipolar II disorder F31.81

 

                          [facility] [place]     [id number] [address]

                          03 May, 2016              Bipolar II disorder F31.81

 

                          [facility] [place]     [address] [address]

                                        Bipolar II disorder F31.81

 

                          [facility] [place]     [address] [address]

                          30 Mar, 2016              Bipolar II disorder F31.81

 

                          [facility] [place]     [address] [address]

                                        Bipolar II disorder F31.81

 

                          [facility] [place]     [address] [address]

                                        Bipolar II disorder F31.81

 

                          [facility] [place]     [address] [address]

                          15 Abdirahman, 2016              Bipolar II disorder F31.81

 

                          [facility] [place]     [address] [place] [address]

                                        Bipolar II disorder F31.81

 

                          [facility] [place]     [address] [address]

                          30 Oct, 2015              Bipolar II disorder F31.81

 

                          [facility] [place]     [address] [address]

                          07 Oct, 2015              Bipolar II disorder F31.81

 

                          [facility] [place]     [address] [address]

                          22 Sep, 2015              Bipolar II disorder 296.89

 

                          [facility] [place]     [address] [address]

                          28 Aug, 2015              Bipolar II disorder 296.89







IMMUNIZATIONS

No Known Immunizations



SOCIAL HISTORY

Never Assessed



REASON FOR VISIT

 Follow-up Bipolar Disorder



PLAN OF CARE







                          Activity                  Details









                                         









                          Follow Up                 4 Weeks Reason: Follow-up







VITAL SIGNS





MEDICATIONS

Unknown Medications



RESULTS

No Results



PROCEDURES







                Procedure       Date Ordered    Result          Body Site

 

                [facility] VISIT MENTAL HEALTH ESTAB PT    2018                     

 

                          Psychotherapy, patient &/family, 30 minutes, established patient    2018 

                                                     







INSTRUCTIONS





MEDICATIONS ADMINISTERED

No Known Medications

## 2019-07-16 NOTE — XMS REPORT
[facility]

                             Created on: 11/10/2017



Estrella Rose

External Reference #: [id number]

: 1945

Sex: Female



Demographics







                          Address                   [address]

[place]  [address]

 

                          Preferred Language        Unknown

 

                          Marital Status            Unknown

 

                          Yarsanism Affiliation     Unknown

 

                          Race                      Unknown

 

                          Ethnic Group              Unknown





Author







                          Author                    SHANE POWERS

 

                          [facility]

 

                          Address                   [facility] [place]  [address]



 

                          Phone                     [phone]







Care Team Providers







                    Care Team Member Name    Role                Phone

 

                    SHANE POWERS    Unavailable         [phone]







PROBLEMS







          Type      Condition    ICD9-CM Code    QLL73-JS Code    Onset Dates    Condition Status    SNOMED

 Code

 

          Problem    Bipolar I disorder with anxious distress              F31.9               Active    [id number]









ALLERGIES

No Information



SOCIAL HISTORY

Never Assessed



PLAN OF CARE







                          Activity                  Details









                                         









                          Follow Up                 2 Weeks Reason: Follow-up







VITAL SIGNS





MEDICATIONS

Unknown Medications



RESULTS

No Results



PROCEDURES







                Procedure       Date Ordered    Result          Body Site

 

                [facility] VISIT MENTAL HEALTH ESTAB PT    May 31, 2017                     

 

                    Psychotherapy, patient &/family, 45 minutes, established patient    May 31, 2017        

                                         







IMMUNIZATIONS

No Known Immunizations

## 2019-07-16 NOTE — XMS REPORT
[facility]

                             Created on: 2018



Estrella Rose

External Reference #: [id number]

: 1945

Sex: Female



Demographics







                          Address                   [address] [place]  [address]

 

                          Preferred Language        Unknown

 

                          Marital Status            Unknown

 

                          Catholic Affiliation     Unknown

 

                          Race                      Unknown

 

                          Ethnic Group              Unknown





Author







                          Author                    SHANE POWERS

 

                          Organization              [facility]

 

                          Address                   [address] [place]  [address]



 

                          Phone                     [phone]







Care Team Providers







                    Care Team Member Name    Role                Phone

 

                    GIOSHABBIRELA    Unavailable         [phone]







PROBLEMS







          Type      Condition    ICD9-CM Code    LHY14-OI Code    Onset Dates    Condition Status    SNOMED

 Code

 

          Problem    Bipolar I disorder with anxious distress              F31.9               Active    [id number]









ALLERGIES

No Information



ENCOUNTERS







                Encounter       Location        Date            Diagnosis

 

                          [place] [id number] [place] [address]

                                         

 

                          [place] [id number] [place] [address]

                                        Bipolar I disorder with anxious distress F31.9

 

                          [facility]     [id number] [place] [address]

                                        Bipolar I disorder with anxious distress F31.9

 

                          [facility]     [id number] [place] [address]

                          26 Mar, 2018              Bipolar I disorder with anxious distress F31.9

 

                          [facility]     [id number] [place] [address]

                                        Bipolar I disorder with anxious distress F31.9

 

                          [facility]     [id number] [place] [address]

                                        Bipolar I disorder with anxious distress F31.9

 

                          [facility]     [id number] [place] [address]

                                        Bipolar I disorder with anxious distress F31.9

 

                          [facility]     [id number] [place] [address]

                          30 Oct, 2017              Bipolar I disorder with anxious distress F31.9

 

                          [facility]     [id number] [address]

                          02 Oct, 2017              Bipolar I disorder with anxious distress F31.9

 

                          [facility]     [address] [facility] [id number] [place] [address]

                          08 Aug, 2017              Bipolar I disorder with anxious distress F31.9

 

                          [facility]     [id number] [place] [address]

                                        Bipolar I disorder with anxious distress F31.9

 

                          [facility]     [address] [facility] [id number] [address]

                                        Bipolar II disorder F31.81

 

                          [facility]     [id number] [place] [address]

                                         

 

                          [facility]     [address] [facility] [id number] [address]

                                        Bipolar II disorder F31.81

 

                          [facility]     [address] [place] [address]

                          31 May, 2017              Bipolar II disorder F31.81

 

                          [facility]     [id number] [address]

                          17 May, 2017              Bipolar II disorder F31.81

 

                          [facility]     [id number] [address]

                          03 May, 2017              Bipolar II disorder F31.81

 

                          [facility]     [address] [address]

                                        Bipolar II disorder F31.81

 

                          [facility]     [id number] [address]

                          21 Mar, 2017              Bipolar II disorder F31.81

 

                          [facility]     [id number] [address]

                                        Bipolar II disorder F31.81

 

                          [facility]     [id number] [address]

                          13 Oct, 2016              Bipolar II disorder F31.81

 

                          [facility]     [address] [facility] [id number] [address]

                          23 Sep, 2016              Bipolar II disorder F31.81

 

                          [facility]     [address] [facility] [id number] [place] [address]

                          15 Sep, 2016              Bipolar II disorder F31.81

 

                          [facility]     [id number] [place] [address]

                          01 Sep, 2016              Bipolar II disorder F31.81

 

                          [facility]     [id number] [place] [address]

                          18 Aug, 2016              Bipolar II disorder F31.81

 

                          [facility]     [id number] [place] [address], [place] [address]

                          04 Aug, 2016              Bipolar II disorder F31.81

 

                          [facility]     [id number] [place] [address]

                                        Bipolar II disorder F31.81

 

                          [facility]     [address] [address]

                                        Bipolar II disorder F31.81

 

                          [facility]     [address] [place] [address]

                          10 Gary, 2016              Bipolar II disorder F31.81

 

                          [facility]     [id number] [address]

                          25 May, 2016              Bipolar II disorder F31.81

 

                          [facility]     [id number] [address]

                          03 May, 2016              Bipolar II disorder F31.81

 

                          [facility]     [id number] [address]

                                        Bipolar II disorder F31.81

 

                          [facility]     [id number] [address]

                          30 Mar, 2016              Bipolar II disorder F31.81

 

                          [facility]     [id number] [address]

                                        Bipolar II disorder F31.81

 

                          [facility]     [id number] [address]

                                        Bipolar II disorder F31.81

 

                          [facility]     [id number] [address]

                          15 Abdirahman, 2016              Bipolar II disorder F31.81

 

                          [facility]     [id number] [address]

                                        Bipolar II disorder F31.81

 

                          [facility]     [id number] [address]

                          30 Oct, 2015              Bipolar II disorder F31.81

 

                          [facility]     [address] [address]

                          07 Oct, 2015              Bipolar II disorder F31.81

 

                          [facility]     [address] [address]

                          22 Sep, 2015              Bipolar II disorder 296.89

 

                          [facility]     [id number] [address]

                          28 Aug, 2015              Bipolar II disorder 296.89







IMMUNIZATIONS

No Known Immunizations



SOCIAL HISTORY

Never Assessed



REASON FOR VISIT

 Follow-up Bipolar Disorder



PLAN OF CARE







                          Activity                  Details









                                         









                          Follow Up                 5W Reason: Follow-up







VITAL SIGNS





MEDICATIONS

Unknown Medications



RESULTS

No Results



PROCEDURES







                Procedure       Date Ordered    Result          Body Site

 

                [facility] VISIT MENTAL HEALTH ESTAB PT    2018                     

 

                    Psychotherapy, patient &/family, 45 minutes, established patient    2018        

                                         







INSTRUCTIONS





MEDICATIONS ADMINISTERED

No Known Medications

## 2019-07-16 NOTE — XMS REPORT
[facility]

                             Created on: 2016



Estrella Rose

External Reference #: [id number]

: 1945

Sex: Female



Demographics







                          Address                   [address] [place]  [address]

 

                          Home Phone                [phone]

 

                          Preferred Language        Unknown

 

                          Marital Status            Unknown

 

                          Gnosticism Affiliation     Unknown

 

                          Race                      White

 

                          Ethnic Group              Refused to Report





Author







                          ARELY Harris

 

                          [facility]              [employer]

 

                          Address                   Unknown

 

                          Phone                     Unavailable







Care Team Providers







                    Care Team Member Name    Role                Phone

 

                    ARELY PIMENTEL    CP                  Unavailable



                                                                



Allergies

          No Known Allergies                                                    
                                   



Problems

          





             Problem Type    Condition    Code         Onset Dates    Condition Status

 

             Assessment    Bipolar II disorder    F31.81                    Active

 

             Problem      Bipolar II disorder    F31.81                    Active



                                                                                
                 



Medications

          No Known Medications                                                  
                                     



Procedures

          





                Procedure       Coding System    Code            Date

 

                Psychotherapy, patient &/family, 45 minutes, established patient    CPT-4           24951           

2016

 

                [facility] VISIT MENTAL HEALTH ESTAB PT    CPT-4                      2016



                                                                                
       



Results

          No Known Results                                                      
             



Summary Purpose

          [employer] Submission

## 2019-07-16 NOTE — XMS REPORT
[facility]

                             Created on: 2018



Estrella Rose

External Reference #: [id number]

: 1945

Sex: Female



Demographics







                          Address                   [address] [place]  [address]

 

                          Preferred Language        Unknown

 

                          Marital Status            Unknown

 

                          Oriental orthodox Affiliation     Unknown

 

                          Race                      Unknown

 

                          Ethnic Group              Unknown





Author







                          Author                    SHANE POWERS

 

                          Organization              [facility]

 

                          Address                   [address] [place]  [address]



 

                          Phone                     [phone]







Care Team Providers







                    Care Team Member Name    Role                Phone

 

                    GIOSHABBIRELA    Unavailable         [phone]







PROBLEMS







          Type      Condition    ICD9-CM Code    QPL64-KS Code    Onset Dates    Condition Status    SNOMED

 Code

 

          Problem    Bipolar I disorder with anxious distress              F31.9               Active    [id number]









ALLERGIES

No Information



ENCOUNTERS







                Encounter       Location        Date            Diagnosis

 

                          [facility]     [id number] [place] [address]

                          13 Aug, 2018               

 

                          [place] [id number] [place] [address]

                                        Bipolar I disorder with anxious distress F31.9

 

                          [facility]     [id number] [place] [address]

                                        Bipolar I disorder with anxious distress F31.9

 

                          [facility]     [id number] [place] [address]

                                        Bipolar I disorder with anxious distress F31.9

 

                          [facility]     [id number] [place] [address]

                          26 Mar, 2018              Bipolar I disorder with anxious distress F31.9

 

                          [facility]     [id number] [place] [address]

                                        Bipolar I disorder with anxious distress F31.9

 

                          [facility]     [id number] [place] [address]

                                        Bipolar I disorder with anxious distress F31.9

 

                          [facility]     [id number] [place] [address]

                                        Bipolar I disorder with anxious distress F31.9

 

                          [facility]     [id number] [address]

                          30 Oct, 2017              Bipolar I disorder with anxious distress F31.9

 

                          [facility]     [address] [place] [address]

                          02 Oct, 2017              Bipolar I disorder with anxious distress F31.9

 

                          [facility]     [address] [address]

                          08 Aug, 2017              Bipolar I disorder with anxious distress F31.9

 

                          [facility]     [address] [address]

                                        Bipolar I disorder with anxious distress F31.9

 

                          [facility]     [address] [place] [address]

                                        Bipolar II disorder F31.81

 

                          [facility]     [address], [place] [address]

                                         

 

                          [facility]     [address] [place] [address]

                                        Bipolar II disorder F31.81

 

                          [facility]     [address] [place] [address]

                          31 May, 2017              Bipolar II disorder F31.81

 

                          [facility]     [address], [place] [address]

                          17 May, 2017              Bipolar II disorder F31.81

 

                          [facility]     [address] [address]

                          03 May, 2017              Bipolar II disorder F31.81

 

                          [facility]     [address] [address]

                                        Bipolar II disorder F31.81

 

                          [facility]     [address] [place] [address]

                          21 Mar, 2017              Bipolar II disorder F31.81

 

                          [facility]     [address] [address]

                                        Bipolar II disorder F31.81

 

                          [facility]     [id number] [address]

                          13 Oct, 2016              Bipolar II disorder F31.81

 

                          [facility]     [id number] [address]

                          23 Sep, 2016              Bipolar II disorder F31.81

 

                          [facility]     [id number] [place] [address]

                          15 Sep, 2016              Bipolar II disorder F31.81

 

                          [facility]     [id number] [place] [address], [place] [address]

                          01 Sep, 2016              Bipolar II disorder F31.81

 

                          [facility]     [id number] [place] [address], [place] [address]

                          18 Aug, 2016              Bipolar II disorder F31.81

 

                          [facility]     [id number] [place] [address]

                          04 Aug, 2016              Bipolar II disorder F31.81

 

                          [facility]     [address] [place] [address]

                                        Bipolar II disorder F31.81

 

                          [facility]     [address] [place] [address]

                                        Bipolar II disorder F31.81

 

                          [facility]     [address] [address]

                          10 Gary, 2016              Bipolar II disorder F31.81

 

                          [facility]     [address] [place] [address]

                          25 May, 2016              Bipolar II disorder F31.81

 

                          [facility]     [id number] [address]

                          03 May, 2016              Bipolar II disorder F31.81

 

                          [facility]     [id number] [address]

                                        Bipolar II disorder F31.81

 

                          [facility]     [address] [address]

                          30 Mar, 2016              Bipolar II disorder F31.81

 

                          [facility]     [address] [address]

                                        Bipolar II disorder F31.81

 

                          [facility]     [address] [address]

                                        Bipolar II disorder F31.81

 

                          [facility]     [address] [place] [address]

                          15 Abdirahman, 2016              Bipolar II disorder F31.81

 

                          [facility]     [address] [place] [address]

                                        Bipolar II disorder F31.81

 

                          [facility]     [id number] [place] [address]

                          30 Oct, 2015              Bipolar II disorder F31.81

 

                          [facility]     [address] [place] [address]

                          07 Oct, 2015              Bipolar II disorder F31.81

 

                          [facility]     [address] [place] [address]

                          22 Sep, 2015              Bipolar II disorder 296.89

 

                          [facility]     [address] [place] [address]

                          28 Aug, 2015              Bipolar II disorder 296.89







IMMUNIZATIONS

No Known Immunizations



SOCIAL HISTORY

Never Assessed



REASON FOR VISIT

 Follow-up Bipolar Disorder



PLAN OF CARE







                          Activity                  Details









                                         









                          Follow Up                 4 Weeks Reason: Follow-up







VITAL SIGNS





MEDICATIONS

Unknown Medications



RESULTS

No Results



PROCEDURES







                Procedure       Date Ordered    Result          Body Site

 

                [facility] VISIT MENTAL HEALTH ESTAB PT    2018                     

 

                          Psychotherapy, patient &/family, 30 minutes, established patient    2018

                                                     







INSTRUCTIONS





MEDICATIONS ADMINISTERED

No Known Medications

## 2019-07-16 NOTE — XMS REPORT
Clinical Summary

                             Created on: 2019



Estrella Rose

External Reference #: [id number]

: 1945

Sex: Female



Demographics







                          Address                   [address]

[place]  [id number]

 

                          Home Phone                [phone]

 

                          Preferred Language        English

 

                          Marital Status            Unknown

 

                          Judaism Affiliation     PRO

 

                          Race                      White

 

                          Ethnic Group              Not  or 





Author







                          Author                    [facility]

 

                          Organization              [facility]

 

                          Address                   Unknown

 

                          Phone                     Unavailable







Support







                Name            Relationship    Address         Phone

 

                Devora RIOS            Unknown         [phone]







Care Team Providers







                    Care Team Member Name    Role                Phone

 

                    WolfÓscar     PCP                 [phone]

 

                    Hadley Higuera MD    20                  [phone]







Source Comments

Some departments are not documenting in the electronic medical record.  If you d
o not see the information that you expected, contact Release of Information in [facility] Information Management department at [phone] for further assistan
ce in locating additional records.[facility]



Allergies







                                        Comments



                 Active Allergy     Reactions       Severity        Noted Date 

 

                                         



                 Adhesive Tape (Rosins)     RASH            Medium          10/11/2017 







Medications







                          End Date                  Status



              Medication     Sig          Dispensed     Refills      Start  



                                         Date  

 

                                                    Active



                     LORazepam (ATIVAN) 0.5 mg     Take 1 tablet       0   



                           tablet                    by mouth     



                                         every 6 hours     



                                         as needed for     



                                         Nausea.     

 

                                                    Active



                     lamoTRIgine (LAMICTAL)     Take 100 mg         0   



                           100 mg tablet             by mouth     



                                         daily.     

 

                                                    Active



                     venlafaxine XR (EFFEXOR     Take 150 mg         0   



                           XR) 150 mg capsule        by mouth     



                                         daily. Take     



                                         with food.     

 

                                                    Active



                     brexpiprazole 2 mg tab     Take  by            0   



                                         mouth.     

 

                                                    Active



                     simvastatin (ZOCOR) 20 mg     Take 20 mg by       0   



                           tablet                    mouth at     



                                         bedtime     



                                         daily.     

 

                                                    Active



                     docusate (COLACE) 100 mg     Take 100 mg         0   



                           capsule                   by mouth     



                                         twice daily.     

 

                                                    Active



                     memantine (NAMENDA) 10 mg     Take 10 mg by       0   



                           tablet                    mouth twice     



                                         daily.     

 

                                                    Active



                     omeprazole DR(+)     Take 20 mg by       0   



                           (PRILOSEC) 20 mg capsule     mouth daily     



                                         before     



                                         breakfast.     

 

                                                    Active



                     aspirin 81 mg chewable     Chew 81 mg by       0   



                           tablet                    mouth daily.     



                                         Take with     



                                         food.     

 

                                                    Active



                     DOCOSAHEXANOIC ACID/EPA     Take  by            0   



                           (FISH OIL PO)             mouth.     

 

                                                    Active



                     MULTIVITAMIN PO     Take  by            0   



                                         mouth.     

 

                                                    Active



                     lisinopril (PRINIVIL;     Take 10 mg by       0   



                           ZESTRIL) 10 mg tablet     mouth daily.     







Active Problems





Not on file



Family History







   



                 Medical History     Relation        Name            Comments

 

   



                           Heart Disease             Brother  

 

   



                           Hypertension              Brother  

 

   



                           Arthritis-osteo           Maternal Aunt  

 

   



                           Cancer-Breast             Maternal Aunt  

 

   



                           Hypertension              Maternal Aunt  

 

   



                           Arthritis-osteo           Mother  

 

   



                           Asthma                    Mother  

 

   



                           Cancer-Breast             Mother  

 

   



                           Cancer-Lung               Mother  

 

   



                           Hypertension              Mother  

 

   



                           Cancer-Breast             Sister  

 

   



                           Hypertension              Sister  









   



                 Relation        Name            Status          Comments

 

   



                                         Brother   

 

   



                                         Maternal Aunt   

 

   



                                         Mother   

 

   



                                         Sister   







Social History







                                        Date



                 Tobacco Use     Types           Packs/Day       Years Used 

 

                                         



                                         Never Smoker    

 

    



                                         Smokeless Tobacco: Never   



                                         Used   









                    Drinks/Week         oz/Week             Comments



                                         Alcohol Use   

 

                                                             



                                         No   









 



                           Sex Assigned at Birth     Date Recorded

 

 



                                         Not on file 









                                        Industry



                           Job Start Date            Occupation 

 

                                        Not on file



                           Not on file               Not on file 









                                        Travel End



                           Travel History            Travel Start 













                                         No recent travel history available.







Last Filed Vital Signs







                    Reading             Time Taken          Comments



                                         Vital Sign   

 

                    120/57              10/11/2017 10:19 AM CDT     



                                         Blood Pressure   

 

                    70                  10/11/2017 10:19 AM CDT     



                                         Pulse   

 

                    36.7 C (98 F)    10/11/2017  9:59 AM CDT     



                                         Temperature   

 

                    14                  10/03/2017  1:40 PM CDT     



                                         Respiratory Rate   

 

                    96%                 10/11/2017 10:19 AM CDT     



                                         Oxygen Saturation   

 

                    -                   -                    



                                         Inhaled Oxygen   



                                         Concentration   

 

                    71.7 kg (158 lb)    10/11/2017  8:46 AM CDT     



                                         Weight   

 

                    162.6 cm (5' 4")    10/11/2017  8:46 AM CDT     



                                         Height   

 

                    27.12               10/11/2017  8:46 AM CDT     



                                         Body Mass Index   







Plan of Treatment







   



                 Health Maintenance     Due Date        Last Done       Comments

 

   



                           PHYSICAL (COMPREHENSIVE)     1951  



                                         EXAM   

 

   



                           DTAP/TDAP VACCINES (1 -     1962  



                                         Tdap)   

 

   



                           COLORECTAL CANCER         1994  



                                         SCREENING   

 

   



                           SHINGLES RECOMBINANT      1994  



                                         VACCINE (1 of 2)   

 

   



                           OSTEOPOROSIS              2009  



                                         SCREENING/MONITORING   

 

   



                           PNEUMONIA (PCV13/PPSV23)     2009  



                                         VACCINES (1 of 2 - PCV13)   

 

   



                           INFLUENZA VACCINE         10/01/2019  







Results

Not on filefrom Last 3 Months



Insurance







                                        Type



            Payer      Benefit     Subscriber ID     Effective     Phone      Address 



                           Plan /                    Dates   



                                         Group     

 

                                        Medicare



                 MEDICARE        MEDICARE        xxxxxxxxxx      2009-P   



                           PART A AND                resent   



                                         B     

 

                                        PPO



                 [facility]            xxxxxxxxxxx     2017-P   



                                         resent   









     



            Guarantor Name     Account     Relation to     Date of     Phone      Billing Address



                     Type                Patient             Birth  

 

     



            Estrella Rose     Personal/F     Self       1945     [phone]     [address]



                     jennifer               (Home)              [place] [address]







Advance Directives







                          Patient Representative    Explanation



                           Type                      Date Recorded  

 

                                                     



                                         Advance   



                                         Directive/DPOA

## 2019-07-16 NOTE — XMS REPORT
[facility]

                             Created on: 2015



Estrella Rose

External Reference #: [id number]

: 1945

Sex: Female



Demographics







                          Address                   [address]

[place]  [address]

 

                          Home Phone                [phone]

 

                          Preferred Language        Unknown

 

                          Marital Status            Unknown

 

                          Roman Catholic Affiliation     Unknown

 

                          Race                      White

 

                          Ethnic Group              Not  or 





Author







                          Author                    [facility]

 

                          Organization              [employer]

 

                          Address                   Unknown

 

                          Phone                     Unavailable







Care Team Providers







                    Care Team Member Name    Role                Phone

 

                    ARELY PIMENTEL    CP                  Unavailable



                                                                



Allergies

          No Known Allergies                                                    
                                   



Problems

          





             Problem Type    Condition    ICD-9 Code    Onset Dates    Condition Status

 

             Assessment    Bipolar II disorder    296.89                    Active



                                                                                
       



Medications

          No Known Medications                                                  
                                     



Procedures

          





                Procedure       Coding System    Code            Date

 

                Psych diagnostic evaluation, new patient    CPT-4           30331           Aug 28, 2015

 

                [facility] NEW PT    CPT-4                      Aug 28, 2015



                                                                                
       



Results

          No Known Results                                                      
             



Summary Purpose

          [employer] Submission

## 2019-07-16 NOTE — XMS REPORT
[facility]

                             Created on: 04/10/2018



Estrella Rose

External Reference #: [id number]

: 1945

Sex: Female



Demographics







                          Address                   [address]  [address]

 

                          Preferred Language        Unknown

 

                          Marital Status            Unknown

 

                          Shinto Affiliation     Unknown

 

                          Race                      Unknown

 

                          Ethnic Group              Unknown





Author







                          Author                    SHANE POWERS

 

                          Organization              [facility]

 

                          Address                   [address] [place]  [address]



 

                          Phone                     [phone]







Care Team Providers







                    Care Team Member Name    Role                Phone

 

                    GIOSHABBIRELA    Unavailable         [phone]







PROBLEMS







          Type      Condition    ICD9-CM Code    AWQ51-IQ Code    Onset Dates    Condition Status    SNOMED

 Code

 

          Problem    Bipolar I disorder with anxious distress              F31.9               Active    [id number]









ALLERGIES

No Information



ENCOUNTERS







                Encounter       Location        Date            Diagnosis

 

                          [facility]     [id number] [place] [address]

                                         

 

                          [place] [id number] [place] [address]

                          26 Mar, 2018              Bipolar I disorder with anxious distress F31.9

 

                          [facility]     [id number] [place] [address]

                                        Bipolar I disorder with anxious distress F31.9

 

                          [facility]     [id number] [place] [address]

                                        Bipolar I disorder with anxious distress F31.9

 

                          [facility]     [id number] [place] [address]

                                        Bipolar I disorder with anxious distress F31.9

 

                          [facility]     [id number] [place] [address]

                          30 Oct, 2017              Bipolar I disorder with anxious distress F31.9

 

                          [facility]     [id number] [place] [address]

                          02 Oct, 2017              Bipolar I disorder with anxious distress F31.9

 

                          [facility]     [id number] [place] [address]

                          08 Aug, 2017              Bipolar I disorder with anxious distress F31.9

 

                          [facility]     [id number] [address]

                                        Bipolar I disorder with anxious distress F31.9

 

                          [facility]     [address] [place] [address]

                                        Bipolar II disorder F31.81

 

                          [facility]     [address] [place] [address]

                                         

 

                          [facility]     [id number] [place] [address]

                                        Bipolar II disorder F31.81

 

                          [facility]     [address] [place] [address]

                          31 May, 2017              Bipolar II disorder F31.81

 

                          [facility]     [address] [place] [address]

                          17 May, 2017              Bipolar II disorder F31.81

 

                          [facility]     [address] [place] [address]

                          03 May, 2017              Bipolar II disorder F31.81

 

                          [facility]     [address] [place] [address]

                                        Bipolar II disorder F31.81

 

                          [facility]     [id number] [address]

                          21 Mar, 2017              Bipolar II disorder F31.81

 

                          [facility]     [address] [place] [address]

                                        Bipolar II disorder F31.81

 

                          [facility]     [address] [address]

                          13 Oct, 2016              Bipolar II disorder F31.81

 

                          [facility]     [address] [place] [address]

                          23 Sep, 2016              Bipolar II disorder F31.81

 

                          [facility]     [address] [address]

                          15 Sep, 2016              Bipolar II disorder F31.81

 

                          [facility]     [id number] [address]

                          01 Sep, 2016              Bipolar II disorder F31.81

 

                          [facility]     [address] [place] [address], [place] [address]

                          18 Aug, 2016              Bipolar II disorder F31.81

 

                          [facility]     [address] [place] [address]

                          04 Aug, 2016              Bipolar II disorder F31.81

 

                          [facility]     [address] [place] [address]

                                        Bipolar II disorder F31.81

 

                          [facility]     [address] [place] [address]

                                        Bipolar II disorder F31.81

 

                          [facility]     [address] [place] [address]

                          10 Gary, 2016              Bipolar II disorder F31.81

 

                          [facility]     [address] [place] [address]

                          25 May, 2016              Bipolar II disorder F31.81

 

                          [facility]     [address] [place] [address]

                          03 May, 2016              Bipolar II disorder F31.81

 

                          [facility]     [address] [address]

                                        Bipolar II disorder F31.81

 

                          [facility]     [address] [place] [address]

                          30 Mar, 2016              Bipolar II disorder F31.81

 

                          [facility]     [address] [address]

                                        Bipolar II disorder F31.81

 

                          [facility]     [address] [place] [address]

                                        Bipolar II disorder F31.81

 

                          [facility]     [address] [address]

                          15 Abdirahman, 2016              Bipolar II disorder F31.81

 

                          [facility]     [id number] [address]

                                        Bipolar II disorder F31.81

 

                          [facility]     [id number] [address]

                          30 Oct, 2015              Bipolar II disorder F31.81

 

                          [facility]     [id number] [address]

                          07 Oct, 2015              Bipolar II disorder F31.81

 

                          [facility]     [address] [address]

                          22 Sep, 2015              Bipolar II disorder 296.89

 

                          [facility]     [id number] [address]

                          28 Aug, 2015              Bipolar II disorder 296.89







IMMUNIZATIONS

No Known Immunizations



SOCIAL HISTORY

Never Assessed



REASON FOR VISIT

 Follow-up Bipolar Disorder



PLAN OF CARE







                          Activity                  Details









                                         









                          Follow Up                 2 Weeks Reason: Follow-up







VITAL SIGNS





MEDICATIONS

Unknown Medications



RESULTS

No Results



PROCEDURES







                Procedure       Date Ordered    Result          Body Site

 

                [facility] VISIT MENTAL HEALTH ESTAB PT    2017                     

 

                          Psychotherapy, patient &/family, 45 minutes, established patient    2017 

                                                     







INSTRUCTIONS





MEDICATIONS ADMINISTERED

No Known Medications

## 2019-07-16 NOTE — XMS REPORT
[facility]

                             Created on: 10/14/2016



Estrella Rose

External Reference #: [id number]

: 1945

Sex: Female



Demographics







                          Address                   [address] [place]  [address]

 

                          Home Phone                [phone]

 

                          Preferred Language        Unknown

 

                          Marital Status            Unknown

 

                          Worship Affiliation     Unknown

 

                          Race                      White

 

                          Ethnic Group              Refused to Report





Author







                          ARELY Harris

 

                          [facility]              [employer]

 

                          Address                   Unknown

 

                          Phone                     Unavailable







Care Team Providers







                    Care Team Member Name    Role                Phone

 

                    ARELY PIMENTEL    CP                  Unavailable



                                                                



Allergies

          No Known Allergies                                                    
                                   



Problems

          





             Problem Type    Condition    Code         Onset Dates    Condition Status

 

             Assessment    Bipolar II disorder    F31.81                    Active

 

             Problem      Bipolar II disorder    F31.81                    Active



                                                                                
                 



Medications

          No Known Medications                                                  
                                     



Procedures

          





                Procedure       Coding System    Code            Date

 

                Psychotherapy, patient &/family, 45 minutes, established patient    CPT-4           19947           

Oct 13, 2016

 

                [facility] VISIT MENTAL HEALTH ESTAB PT    CPT-4                      Oct 13, 2016



                                                                                
       



Results

          No Known Results                                                      
             



Summary Purpose

          [employer] Submission

## 2019-07-16 NOTE — XMS REPORT
[facility]

                             Created on: 2016



Estrella Rose

External Reference #: [id number]

: 1945

Sex: Female



Demographics







                          Address                   [address] [place]  [address]

 

                          Home Phone                [phone]

 

                          Preferred Language        Unknown

 

                          Marital Status            Unknown

 

                          Yazidism Affiliation     Unknown

 

                          Race                      White

 

                          Ethnic Group              Refused to Report





Author







                          ARELY Harris

 

                          [facility]              [employer]

 

                          Address                   Unknown

 

                          Phone                     Unavailable







Care Team Providers







                    Care Team Member Name    Role                Phone

 

                    ARELY PIMENTEL    CP                  Unavailable



                                                                



Allergies

          No Known Allergies                                                    
                                   



Problems

          





             Problem Type    Condition    Code         Onset Dates    Condition Status

 

             Assessment    Bipolar II disorder    F31.81                    Active

 

             Problem      Bipolar II disorder    F31.81                    Active



                                                                                
                 



Medications

          No Known Medications                                                  
                                     



Procedures

          





                Procedure       Coding System    Code            Date

 

                Psychotherapy, patient &/family, 45 minutes, established patient    CPT-4           62813           

2016

 

                [facility] VISIT MENTAL HEALTH ESTAB PT    CPT-4                      2016



                                                                                
       



Results

          No Known Results                                                      
             



Summary Purpose

          [employer] Submission

## 2019-07-17 ENCOUNTER — HOSPITAL ENCOUNTER (INPATIENT)
Dept: HOSPITAL 75 - IRF | Age: 74
LOS: 7 days | Discharge: HOME HEALTH | DRG: 561 | End: 2019-07-24
Attending: INTERNAL MEDICINE | Admitting: INTERNAL MEDICINE
Payer: MEDICARE

## 2019-07-17 VITALS — BODY MASS INDEX: 28.87 KG/M2 | WEIGHT: 173.28 LBS | HEIGHT: 65 IN

## 2019-07-17 VITALS — DIASTOLIC BLOOD PRESSURE: 71 MMHG | SYSTOLIC BLOOD PRESSURE: 115 MMHG

## 2019-07-17 VITALS — DIASTOLIC BLOOD PRESSURE: 63 MMHG | SYSTOLIC BLOOD PRESSURE: 112 MMHG

## 2019-07-17 VITALS — SYSTOLIC BLOOD PRESSURE: 132 MMHG | DIASTOLIC BLOOD PRESSURE: 71 MMHG

## 2019-07-17 VITALS — SYSTOLIC BLOOD PRESSURE: 116 MMHG | DIASTOLIC BLOOD PRESSURE: 64 MMHG

## 2019-07-17 VITALS — SYSTOLIC BLOOD PRESSURE: 126 MMHG | DIASTOLIC BLOOD PRESSURE: 70 MMHG

## 2019-07-17 VITALS — SYSTOLIC BLOOD PRESSURE: 112 MMHG | DIASTOLIC BLOOD PRESSURE: 67 MMHG
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
## 2019-07-17 DIAGNOSIS — F03.90: ICD-10-CM

## 2019-07-17 DIAGNOSIS — S32.019D: Primary | ICD-10-CM

## 2019-07-17 DIAGNOSIS — F31.9: ICD-10-CM

## 2019-07-17 DIAGNOSIS — I10: ICD-10-CM

## 2019-07-17 DIAGNOSIS — F41.9: ICD-10-CM

## 2019-07-17 DIAGNOSIS — Z90.710: ICD-10-CM

## 2019-07-17 DIAGNOSIS — K59.03: ICD-10-CM

## 2019-07-17 DIAGNOSIS — E78.5: ICD-10-CM

## 2019-07-17 PROCEDURE — 36415 COLL VENOUS BLD VENIPUNCTURE: CPT

## 2019-07-17 PROCEDURE — 80053 COMPREHEN METABOLIC PANEL: CPT

## 2019-07-17 PROCEDURE — 85025 COMPLETE CBC W/AUTO DIFF WBC: CPT

## 2019-07-17 RX ADMIN — ASPIRIN SCH MG: 81 TABLET ORAL at 20:32

## 2019-07-17 RX ADMIN — OXYCODONE HYDROCHLORIDE AND ACETAMINOPHEN PRN TAB: 5; 325 TABLET ORAL at 17:04

## 2019-07-17 RX ADMIN — LACTULOSE SCH GM: 20 SOLUTION ORAL at 20:39

## 2019-07-17 RX ADMIN — LATANOPROST SCH DROP: 50 SOLUTION/ DROPS OPHTHALMIC at 20:44

## 2019-07-17 RX ADMIN — POLYETHYLENE GLYCOL (3350) SCH GM: 17 POWDER, FOR SOLUTION ORAL at 13:30

## 2019-07-17 RX ADMIN — SIMVASTATIN SCH MG: 20 TABLET, FILM COATED ORAL at 20:33

## 2019-07-17 RX ADMIN — DOCUSATE SODIUM AND SENNOSIDES SCH EA: 8.6; 5 TABLET, FILM COATED ORAL at 20:34

## 2019-07-17 RX ADMIN — RISPERIDONE SCH MG: 0.25 TABLET, FILM COATED ORAL at 20:33

## 2019-07-17 RX ADMIN — ENOXAPARIN SODIUM SCH MG: 100 INJECTION SUBCUTANEOUS at 17:03

## 2019-07-17 RX ADMIN — SODIUM CHLORIDE, SODIUM LACTATE, POTASSIUM CHLORIDE, AND CALCIUM CHLORIDE SCH MLS/HR: 600; 310; 30; 20 INJECTION, SOLUTION INTRAVENOUS at 07:34

## 2019-07-17 RX ADMIN — DONEPEZIL HYDROCHLORIDE SCH MG: 5 TABLET, FILM COATED ORAL at 20:33

## 2019-07-17 RX ADMIN — LORAZEPAM SCH MG: 0.5 TABLET ORAL at 20:34

## 2019-07-17 RX ADMIN — DOCUSATE SODIUM SCH MG: 100 CAPSULE ORAL at 13:00

## 2019-07-17 RX ADMIN — DOCUSATE SODIUM AND SENNOSIDES SCH EA: 8.6; 5 TABLET, FILM COATED ORAL at 13:30

## 2019-07-17 RX ADMIN — OXYCODONE HYDROCHLORIDE AND ACETAMINOPHEN PRN TAB: 5; 325 TABLET ORAL at 07:35

## 2019-07-17 RX ADMIN — DOCUSATE SODIUM SCH MG: 100 CAPSULE ORAL at 20:32

## 2019-07-17 RX ADMIN — MEMANTINE HYDROCHLORIDE SCH MG: 10 TABLET ORAL at 20:43

## 2019-07-17 RX ADMIN — OXYCODONE HYDROCHLORIDE AND ACETAMINOPHEN PRN TAB: 5; 325 TABLET ORAL at 12:20

## 2019-07-17 RX ADMIN — LACTULOSE SCH GM: 20 SOLUTION ORAL at 13:30

## 2019-07-17 RX ADMIN — POLYETHYLENE GLYCOL (3350) SCH GM: 17 POWDER, FOR SOLUTION ORAL at 20:48

## 2019-07-17 RX ADMIN — RISPERIDONE SCH MG: 0.25 TABLET, FILM COATED ORAL at 17:04
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
## 2019-07-17 NOTE — NUR
assessments & interventions completed, see assessments & interventions, back to bed, side 
rails up x4, bed alarm on

## 2019-07-17 NOTE — PHYSICAL THERAPY DAILY NOTE
PT Daily Note-Current


Subjective


Pt sitting in recliner upon arrival.  Pt demonstrates confusion but very 

pleasant.  Pt agrees to work with PT.





Pain





   Location:  No Pain Reported





Mental Status


Patient Orientation:  Person, Confused, Place





Transfers


Therapy Code Descriptions/Definitions 





Functional [place] Measure:


0=Not Assessed/NA        4=Minimal Assistance


1=Total Assistance        5=Supervision or Setup


2=Maximal Assistance  6=Modified [place]


3=Moderate Assistance 7=Complete [place]








Therapy Quality Codes:


6    Independent with activity with or without an assistive device


5    Patient requires set up or clean up by helper.  Patient completes activity 

by  themselves


4    Supervision or touching assist (CGA). [place] provide cues , steadying 

assist


3    The helper provides less than half the effort to complete the activity


2    The helper provides more than half the effort to complete the activity


1    Dependent.  The helper does all the effort to complete an activity 


7    Patient refused to complete or attempt activity


9    The patient did not perform the activity before the current illness or 

injury


88  Not attempted due to Medical conditions or safety concerns


Scootin


Sit to/from Stand:  4


Sit to Stand (QC):  4





Weight Bearing


Full Weight Bearing


Full Weight Bearing





Treatments


PTA discussing some ARU Expectations with pt when pt wants to lay down.  Pt 

transfers to Supine in bed with all needs met, call light next to pt.





Assessment


Current Status:  Fair Progress


Pt tries to get up out of recliner or bed w/o staff.  Nurse is notified.  Pt has

chair & bed alarms set.





PT Short Term Goals


Short Term Goals


Time Frame:  2019


Transfers (B,C,W/C) (FIM):  5


Gait (FIM):  5





PT Long Term Goals


Long Term Goals


PT Long Term Goals Time Frame:  2019


Transfers (B,C,W/C) (FIM):  7


Sit to Lying (QC):  6


Lying-Sitting on Side/Bed(QC):  6


Sit to Stand (QC):  6


Roll Left to Right (QC):  6


Chair/Bed-to-Chair Xfer(QC):  6


Car Transfer (QC):  6


Does the Patient Walk:  Yes


Gait (FIM):  6


Gait distance (FIM):  3=150 ft


Walk 10 feet (QC):  6


Walk 10ft-Uneven Surface(QC):  6


Walk 50ft with 2 Turns (QC):  6


Walk 150 ft (QC):  6


Gait Assistive Device:  FWW


Does the Pt use WC or Scooter?:  No


Stairs (FIM):  5


# of Steps:  4


1 Step (curb) (QC):  6


4 Steps (QC):  6


12 Steps (QC):  88


Picking up an Object (QC):  88





PT Plan


Problem List


Problem List:  Activity Tolerance, Functional Strength, Safety, Transfer





Treatment/Plan


Treatment Plan:  Continue Plan of Care


Treatment Plan:  Bed Mobility, Education, Functional Activity [place], Functional 

Strength, Group Therapy, Gait, Safety, Therapeutic Exercise, Transfers


Treatment Duration:  2019


Frequency:  At least 5 of 7 days/Wk (IRF)


Estimated Hrs Per Day:  1.5 hours per day


Patient and/or Family Agrees t:  Yes





Safety Risks/Education


Patient Education:  Transfer Techniques, Correct Positioning, Safety Issues


Teaching Recipient:  Patient


Teaching Methods:  Discussion


Response to Teaching:  Reinforcement Needed





Time/GCodes


Time In:  1455


Time Out:  1510


Total Billed Treatment Time:  15


Total Billed Treatment


1, FA (15m)


G Codes Necessary:  FAUSTINO Conte PTA              2019 16:15
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
## 2019-07-17 NOTE — NUR
REVIEWED MEDICATIONS AS THEY WERE REPORTED UPON ADMISSION TO 4TH FLOOR. NO CHANGES WERE MADE 
WHEN THE PATIENT WENT TO REHAB.
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
## 2019-07-17 NOTE — NUR
Patient  had been laying in bed with bed alarm on.  Patient was able to get out of bed and 
to the nurses desk, where she was helped by a staff member back to her bed.  Patient had 
incontinent episode and appeared more disoriented than at admission. Patient offered 
reassurance, new clothing obtained.  Physician and House Supervisor notified.  New 
medication orders received and Tele Sitter initiated.
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
## 2019-07-17 NOTE — XMS REPORT
Continuity of Care Document

                             Created on: 2019



GRACY OSEGUERA

External Reference #: [id number]

: 1945

Sex: Female



Demographics







                          Address                   [address] [place]  [address]

 

                          Home Phone                [phone] x

 

                          Preferred Language        Unknown

 

                          Marital Status            Unknown

 

                          [place] Affiliation     Unknown

 

                          Race                      Unknown

 

                          Ethnic Group              Unknown





Author







                          Organization              Unknown

 

                          Address                   Unknown

 

                          Phone                     [phone]



              



Allergies

      





             Active              Description              Code              Type              Severity

                Reaction              Onset              Reported/Identified              Relationship

 to Patient                             Clinical Status        

 

             Yes              FLEXARIL              FLEXARIL                             Unknown     

             N/A                             2010                                      

 

                Yes              hydrocodone              [id number]              Drug Allergy        

             Unknown              N/A                             2010                      

                                                 

 

           Yes              TAPE              TAPE                             Unknown              N/A

                                2010                                       

 

                Yes              Tetanus Vaccines   Toxoid              [id number]              Drug Allergy

              Unknown              N/A                             2010              

                                                 

 

                Yes              Tetanus Vaccines and Toxoid              [id number]              Drug

 Allergy              Unknown              N/A                              2010    

                                                             



                          



Medications

      



There is no data.                  



Problems

      





             Date Dx Coded              Attending              Type              Code              Diagnosis

                                        Diagnosed By        

 

                2015              SHERINE CRUZ DO              Ot              294.9          

                                                             

 

                2015              SHERINE JEAN DO              Ot              V76.12       

                                                             

 

                10/13/2017              SHERINE JEAN DO              Ot              Z12.31       

                          ENCNTR SCREEN MAMMOGRAM FOR MALIGNANT NE                       

 

                2017              SHERINE JEAN DO              Ot              Z12.31       

                          ENCNTR SCREEN MAMMOGRAM FOR MALIGNANT NE                       

 

                2018              SHERINE JEAN DO              Ot              R92.8        

                          OTH ABN AND INCONCLUSIVE FINDINGS ON DX                        

 

                2018              SHERINE JEAN DO              Ot              Z12.31       

                          ENCNTR SCREEN MAMMOGRAM FOR MALIGNANT NE                       

 

                2018              SHERINE JEAN DO              Ot              R92.8        

                          OTH ABN AND INCONCLUSIVE FINDINGS ON DX                        

 

                2018              SHERINE JEAN DO              Ot              Z12.31       

                          ENCNTR SCREEN MAMMOGRAM FOR MALIGNANT NE                       

 

                2018              SHERINE JEAN DO              Ot              R92.2        

                          INCONCLUSIVE MAMMOGRAM                       

 

                2018              SHERINE JEAN DO              Ot              R92.2        

                          INCONCLUSIVE MAMMOGRAM                       

 

                2019              SHERINE JEAN DO              Ot              R92.2        

                          INCONCLUSIVE MAMMOGRAM                       



                                      



Procedures

      



There is no data.                  



Results

      





                    Test                Result              Range        









                                        Complete blood count (CBC) with automated white blood cell (WBC) differential - 

19 13:00         









                          Blood leukocytes automated count (number/volume)              8.4 10*3/uL         

                                        4.3-11.0        

 

                          Blood erythrocytes automated count (number/volume)              4.54 10*6/uL      

                                        4.35-5.85        

 

                          Venous blood hemoglobin measurement (mass/volume)              13.1 g/dL          

                                        11.5-16.0        

 

                    Blood hematocrit (volume fraction)              40 %                35-52        

 

                    Automated erythrocyte mean corpuscular volume              88 [foz_us]              

80-99        

 

                                        Automated erythrocyte mean corpuscular hemoglobin (mass per erythrocyte)        

                          29 pg                     25-34        

 

                                        Automated erythrocyte mean corpuscular hemoglobin concentration measurement (mass/volume)

                          33 g/dL                   32-36        

 

                    Automated erythrocyte distribution width ratio              13.6 %              10.0-

14.5        

 

                    Automated blood platelet count (count/volume)              253 10*3/uL              

130-400        

 

                          Automated blood platelet mean volume measurement              9.4 [foz_us]        

                                        7.4-10.4        

 

                    Automated blood neutrophils/100 leukocytes              70 %                42-75     

   

 

                    Automated blood lymphocytes/100 leukocytes              21 %                12-44     

   

 

                    Blood monocytes/100 leukocytes              8 %                 0-12        

 

                    Automated blood eosinophils/100 leukocytes              1 %                 0-10       

 

 

                    Automated blood basophils/100 leukocytes              1 %                 0-10        

 

                          Blood neutrophils automated count (number/volume)              5.8 10*3           

                                        1.8-7.8        

 

                          Blood lymphocytes automated count (number/volume)              1.8 10*3           

                                        1.0-4.0        

 

                    Blood monocytes automated count (number/volume)              0.7 10*3              0.0-

1.0        

 

                    Automated eosinophil count              0.1 10*3/uL              0.0-0.3        

 

                    Automated blood basophil count (count/volume)              0.0 10*3/uL              

0.0-0.1        









                                        Comprehensive metabolic panel - 19 13:00         









                          Serum or plasma sodium measurement (moles/volume)              140 mmol/L         

                                        135-145        

 

                          Serum or plasma potassium measurement (moles/volume)              4.3 mmol/L      

                                        3.6-5.0        

 

                          Serum or plasma chloride measurement (moles/volume)              106 mmol/L       

                                                

 

                    Carbon dioxide              26 mmol/L              21-32        

 

                          Serum or plasma anion gap determination (moles/volume)              8 mmol/L      

                                        5-14        

 

                          Serum or plasma urea nitrogen measurement (mass/volume)              19 mg/dL     

                                        7-18        

 

                          Serum or plasma creatinine measurement (mass/volume)              1.02 mg/dL      

                                        0.60-1.30        

 

                    Serum or plasma urea nitrogen/creatinine mass ratio              19                  NRG

        

 

                                        Serum or plasma creatinine measurement with calculation of estimated glomerular 

filtration rate              53                        NRG        

 

                          Serum or plasma glucose measurement (mass/volume)              103 mg/dL          

                                                

 

                          Serum or plasma calcium measurement (mass/volume)              9.8 mg/dL          

                                        8.5-10.1        

 

                          Serum or plasma total bilirubin measurement (mass/volume)              0.4 mg/dL  

                                        0.1-1.0        

 

                                        Serum or plasma alkaline phosphatase measurement (enzymatic activity/volume)    

                          73 U/L                            

 

                                        Serum or plasma aspartate aminotransferase measurement (enzymatic activity/volume)

                          33 U/L                    5-34        

 

                                        Serum or plasma alanine aminotransferase measurement (enzymatic activity/volume)

                          22 U/L                    0-55        

 

                          Serum or plasma protein measurement (mass/volume)              6.6 g/dL           

                                        6.4-8.2        

 

                          Serum or plasma albumin measurement (mass/volume)              4.2 g/dL           

                                        3.2-4.5        

 

                    CALCIUM CORRECTED              9.6 mg/dL              8.5-10.1        



                  



Encounters

      





                ACCT No.              Visit Date/Time              Discharge              Status      

                Pt. Type              Provider              Facility              Loc./Unit      

                                        Complaint        

 

                    [id number]              2018 14:03:00              2018 23:59:59      

                CLS              Outpatient              SHERINE JEAN DO              [facility]              RAD                       ABNORMAL MAMMOGRAM        

 

                    [id number]              2018 15:50:00              2018 23:59:59      

                CLS              [facility]              SHERINE JEAN DO              [facility]              RAD                       ABNORMAL MAMMO        

 

                    [id number]              10/31/2018 10:35:00              10/31/2018 23:59:59      

                CLS              Outpatient              SHERINE JEAN DO              [facility]              RAD                       SCREENING        

 

                    [id number]              10/12/2017 10:35:00              10/12/2017 23:59:59      

                CLS              Outpatient              SHERINE JEAN DO              [facility]              RAD                       SCREENING Z12.31        

 

                    [id number]              2015 10:03:00              2015 23:59:59      

                CLS              Outpatient              SHERINE JEAN DO              [facility]              RAD                                

 

                    [id number]              2014 14:22:00              2014 23:59:59      

                CLS              Outpatient              SHERINE CRUZ DO              [facility]              RAD                                

 

                    [id number]              2014 09:56:00              2014 23:59:59      

             CLS              Outpatient                                                        

                                                 

 

                [id number]              2019 13:11:00                                          

             Document Registration

## 2019-07-17 NOTE — OCCUPATIONAL THER DAILY NOTE
OT Current Status-Daily Note


Subjective


Pt alert, sitting in recliner.  Pt's daughter in room.  Daughter states that pt 

is in early stages of Alzheimer's.  No c/o pain.  Agrees to therapy.  Pt's 

daughter requests to use bed alarm and chair alarm.  Chair alarm placed in room.





Mental Status/Objective


Patient Orientation:  Person, Place, Time, Situation


Therapy Code Descriptions/Definitions 





Functional Beaver Measure:


0=Not Assessed/NA        4=Minimal Assistance


1=Total Assistance        5=Supervision or Setup


2=Maximal Assistance  6=Modified Beaver


3=Moderate Assistance 7=Complete Beaver


Attachments:  IV





ADL-Treatment


Pt agrees to shower.  Min A for sit to stand then CGA using FWW into bathroom.  

Pt transferred using FWW and grabbars to toilet with min A.  CGA to manipulate 

clothing and pt completed hygiene in sitting.  Min A for shower transfer using 

FWW, grabbars and shower bench.  Assist to adjust temperature for shower.  Pt 

able to complete all areas except buttocks and lower legs/feet due to pain.  Min

A to don bra and pull shirt down, threaded arms and head by self to don shirt.  

Assist to thread feet into pant/underpants legs then pulled up legs.  Assist to 

hike pants over hips in standing using FWW for stability.  Pt declined to 

complete grooming at this time.  Pt ambulated back to room and sat in recliner. 

Pt able to set up own food with assistance to sequence or find items on tray 

then used regular utensils to eat.  During session, pt had times that she forgot

what she had done or what she needed to do, verbal cues to focus pt.  Nrsg and 

daughter in room after session.  Call light/phone in reach.  Safety measures in 

place.  All needs met in room.


Therapy Code Descriptions/Definitions 





Functional Beaver Measure:


0=Not Assessed/NA        4=Minimal Assistance


1=Total Assistance        5=Supervision or Setup


2=Maximal Assistance  6=Modified Beaver


3=Moderate Assistance 7=Complete Beaver








Therapy Quality Codes:


6    Independent with activity with or without an assistive device


5    Patient requires set up or clean up by helper.  Patient completes activity 

by  themselves


4    Supervision or touching assist (CGA). [place] provide cues , steadying 

assist


3    The helper provides less than half the effort to complete the activity


2    The helper provides more than half the effort to complete the activity


1    Dependent.  The helper does all the effort to complete an activity 


7    Patient refused to complete or attempt activity


9    The patient did not perform the activity before the current illness or 

injury


88  Not attempted due to Medical conditions or safety concerns


Eating (FIM):  5


Eating (QC):  5


Bathing (FIM):  3


Bathing Location:  L Arm, R Arm, L Upper Leg, R Upper Leg, Chest, Abdomen, 

Perineal Area


Shower/Bathe Self (QC):  3


Upper Body (FIM):  4


Upper Body Dressing (QC):  3


Lower Body Dressing (FIM):  3


Lower Body Dressing (QC):  3


On/Off Footwear (QC):  2


Toileting (FIM):  4


Toileting Hygiene (QC):  4


Transfers (B, C, W/C) (FIM):  4


Toilet/Commode Transfer (FIM):  4


Toilet Transfer (QC):  3


Shower Transfer(FIM):  4


Pt requires education with lower body dressing equipment.





OT Short Term Goals


Short Term Goals


Transfers (B,C,W/C) (FIM):  5


1=Demonstrate adherence to instructed precautions during ADL tasks.


2=Patient will verbalize/demonstrate understanding of assistive 

devices/modifications for ADL.


3=Patient will improve strength/tolerance for activity to enable patient to 

perform ADL's.





OT Long Term Goals


Long Term Goals


Time Frame:  2019


Eating (FIM):  6


Eating (QC):  6


Groomin


Oral Hygiene (QC):  6


Bathing(FIM):  5


Shower/Bathe Self (QC):  5


Upper Body Dressing(FIM):  5


Upper Body Dressing (QC):  5


Lower Body Dressing(FIM):  5


Lower Body Dressing (QC):  5


On/Off Footwear (QC):  5


Toileting(FIM):  6


Toileting Hygiene (QC):  6


Transfers (B,C,W/C) (FIM):  6


Toilet/Commode Transfer(FIM):  6


Toilet/Commode Transfer (QC):  6


Shower Transfer(FIM):  5


Additional Goals:  1-Demonstrate ADL Tasks, 2-Verbalize Understanding, 3-

ImproveStrength/[place]


1=Demonstrate adherence to instructed precautions during ADL tasks.


2=Patient will verbalize/demonstrate understanding of assistive 

devices/modifications for ADL.


3=Patient will improve strength/tolerance for activity to enable patient to 

perform ADL's.





OT Education/Plan


Problem List/Assessment


Assessment:  Impaired Cognition, Impaired Funct Balance, Impaired Self-Care 

Skills





Discharge Recommendations


Plan/Recommendations:  Continue POC





Treatment Plan/Plan of Care


Patient would benefit from OT for education, treatment and training to promote 

independence in ADL's, mobility, safety and/or upper extremity function for 

ADL's.


Plan of Care:  ADL Retraining, Caregiver Training, Functional Mobility, Group 

Exercise/Act as Ind, UE Funct Exercise/Act


Treatment Duration:  2019


Frequency:  At least 5 of 7 days/Wk (IRF)


Estimated Hrs Per Day:  1.5 hours per day


Agreement:  Yes


Rehab Potential:  Good





Time/GCodes


Start Time:  13:35


Stop Time:  14:40


Total Time Billed (hr/min):  65


Billed Treatment Time


1 visit-ADL 4 (65 min)











GUSTAVO NOBLE               2019 14:40

## 2019-07-17 NOTE — XMS REPORT
Clinical Summary

                             Created on: 2019



Estrella Rose

External Reference #: [id number]

: 1945

Sex: Female



Demographics







                          Address                   [address]

[place]  [id number]

 

                          Home Phone                [phone]

 

                          Preferred Language        English

 

                          Marital Status            Unknown

 

                          Mandaen Affiliation     PRO

 

                          Race                      White

 

                          Ethnic Group              Not  or 





Author







                          Author                    [facility]

 

                          Organization              [facility]

 

                          Address                   Unknown

 

                          Phone                     Unavailable







Support







                Name            Relationship    Address         Phone

 

                Devora RIOS            Unknown         [phone]







Care Team Providers







                    Care Team Member Name    Role                Phone

 

                    WolfÓscar     PCP                 [phone]

 

                    Hadley Higuera MD    20                  [phone]







Source Comments

Some departments are not documenting in the electronic medical record.  If you d
o not see the information that you expected, contact Release of Information in [facility] Information Management department at [phone] for further assistan
ce in locating additional records.[facility]



Allergies







                                        Comments



                 Active Allergy     Reactions       Severity        Noted Date 

 

                                         



                 Adhesive Tape (Rosins)     RASH            Medium          10/11/2017 







Medications







                          End Date                  Status



              Medication     Sig          Dispensed     Refills      Start  



                                         Date  

 

                                                    Active



                     LORazepam (ATIVAN) 0.5 mg     Take 1 tablet       0   



                           tablet                    by mouth     



                                         every 6 hours     



                                         as needed for     



                                         Nausea.     

 

                                                    Active



                     lamoTRIgine (LAMICTAL)     Take 100 mg         0   



                           100 mg tablet             by mouth     



                                         daily.     

 

                                                    Active



                     venlafaxine XR (EFFEXOR     Take 150 mg         0   



                           XR) 150 mg capsule        by mouth     



                                         daily. Take     



                                         with food.     

 

                                                    Active



                     brexpiprazole 2 mg tab     Take  by            0   



                                         mouth.     

 

                                                    Active



                     simvastatin (ZOCOR) 20 mg     Take 20 mg by       0   



                           tablet                    mouth at     



                                         bedtime     



                                         daily.     

 

                                                    Active



                     docusate (COLACE) 100 mg     Take 100 mg         0   



                           capsule                   by mouth     



                                         twice daily.     

 

                                                    Active



                     memantine (NAMENDA) 10 mg     Take 10 mg by       0   



                           tablet                    mouth twice     



                                         daily.     

 

                                                    Active



                     omeprazole DR(+)     Take 20 mg by       0   



                           (PRILOSEC) 20 mg capsule     mouth daily     



                                         before     



                                         breakfast.     

 

                                                    Active



                     aspirin 81 mg chewable     Chew 81 mg by       0   



                           tablet                    mouth daily.     



                                         Take with     



                                         food.     

 

                                                    Active



                     DOCOSAHEXANOIC ACID/EPA     Take  by            0   



                           (FISH OIL PO)             mouth.     

 

                                                    Active



                     MULTIVITAMIN PO     Take  by            0   



                                         mouth.     

 

                                                    Active



                     lisinopril (PRINIVIL;     Take 10 mg by       0   



                           ZESTRIL) 10 mg tablet     mouth daily.     







Active Problems





Not on file



Family History







   



                 Medical History     Relation        Name            Comments

 

   



                           Heart Disease             Brother  

 

   



                           Hypertension              Brother  

 

   



                           Arthritis-osteo           Maternal Aunt  

 

   



                           Cancer-Breast             Maternal Aunt  

 

   



                           Hypertension              Maternal Aunt  

 

   



                           Arthritis-osteo           Mother  

 

   



                           Asthma                    Mother  

 

   



                           Cancer-Breast             Mother  

 

   



                           Cancer-Lung               Mother  

 

   



                           Hypertension              Mother  

 

   



                           Cancer-Breast             Sister  

 

   



                           Hypertension              Sister  









   



                 Relation        Name            Status          Comments

 

   



                                         Brother   

 

   



                                         Maternal Aunt   

 

   



                                         Mother   

 

   



                                         Sister   







Social History







                                        Date



                 Tobacco Use     Types           Packs/Day       Years Used 

 

                                         



                                         Never Smoker    

 

    



                                         Smokeless Tobacco: Never   



                                         Used   









                    Drinks/Week         oz/Week             Comments



                                         Alcohol Use   

 

                                                             



                                         No   









 



                           Sex Assigned at Birth     Date Recorded

 

 



                                         Not on file 









                                        Industry



                           Job Start Date            Occupation 

 

                                        Not on file



                           Not on file               Not on file 









                                        Travel End



                           Travel History            Travel Start 













                                         No recent travel history available.







Last Filed Vital Signs







                    Reading             Time Taken          Comments



                                         Vital Sign   

 

                    120/57              10/11/2017 10:19 AM CDT     



                                         Blood Pressure   

 

                    70                  10/11/2017 10:19 AM CDT     



                                         Pulse   

 

                    36.7 C (98 F)    10/11/2017  9:59 AM CDT     



                                         Temperature   

 

                    14                  10/03/2017  1:40 PM CDT     



                                         Respiratory Rate   

 

                    96%                 10/11/2017 10:19 AM CDT     



                                         Oxygen Saturation   

 

                    -                   -                    



                                         Inhaled Oxygen   



                                         Concentration   

 

                    71.7 kg (158 lb)    10/11/2017  8:46 AM CDT     



                                         Weight   

 

                    162.6 cm (5' 4")    10/11/2017  8:46 AM CDT     



                                         Height   

 

                    27.12               10/11/2017  8:46 AM CDT     



                                         Body Mass Index   







Plan of Treatment







   



                 Health Maintenance     Due Date        Last Done       Comments

 

   



                           PHYSICAL (COMPREHENSIVE)     1951  



                                         EXAM   

 

   



                           DTAP/TDAP VACCINES (1 -     1962  



                                         Tdap)   

 

   



                           COLORECTAL CANCER         1994  



                                         SCREENING   

 

   



                           SHINGLES RECOMBINANT      1994  



                                         VACCINE (1 of 2)   

 

   



                           OSTEOPOROSIS              2009  



                                         SCREENING/MONITORING   

 

   



                           PNEUMONIA (PCV13/PPSV23)     2009  



                                         VACCINES (1 of 2 - PCV13)   

 

   



                           INFLUENZA VACCINE         10/01/2019  







Results

Not on filefrom Last 3 Months



Insurance







                                        Type



            Payer      Benefit     Subscriber ID     Effective     Phone      Address 



                           Plan /                    Dates   



                                         Group     

 

                                        Medicare



                 MEDICARE        MEDICARE        xxxxxxxxxx      [id number]   



                           PART A AND                resent   



                                         B     

 

                                        PPO



                 [facility]            xxxxxxxxxxx     2017-P   



                                         resent   









     



            Guarantor Name     Account     Relation to     Date of     Phone      Billing Address



                     Type                Patient             Birth  

 

     



            Estrella Rose     Personal/F     Self       1945     [phone]     [address]



                     jennifer               (Home)              [place] [address]







Advance Directives







                          Patient Representative    Explanation



                           Type                      Date Recorded  

 

                                                     



                                         Advance   



                                         Directive/DPOA

## 2019-07-17 NOTE — NUR
Estrella Rose admitted to room 229-1, with an admitting diagnosis of L1 Fracture, on 
07/17/19 from Fourth Floor Med/Surg via wheelchair, accompanied by Adult Daughter, 
Devora.ESTRELLA ROSE introduced to surroundings, call light, bed controls, phone, TV, 
temperature control, lights, meal times, smoking policy, visitor policy, side rail policy, 
bathrooms and showers.  Patient Rights given to patient in the handbook.ESTRELLA ROSE 
verbalizes understanding that [facility] is not responsible for the loss or damage to any 
personal effects or valuables that are kept in the patients possession during their 
hospitalization.  The following Patient Care Plans were discussed with the Patient: 
Discharge Planning,Weakness, Risk for Falls. ESTRELLA ROSE verbalizes understanding of 
Interdisciplinary Patient Education.

## 2019-07-17 NOTE — NUR
bedside report received from SADE WILSON, assume care of pt, pt will not stay in room out to 
ware with nursing supervision pt very confused wanting to go home

## 2019-07-18 VITALS — SYSTOLIC BLOOD PRESSURE: 134 MMHG | DIASTOLIC BLOOD PRESSURE: 73 MMHG

## 2019-07-18 VITALS — DIASTOLIC BLOOD PRESSURE: 70 MMHG | SYSTOLIC BLOOD PRESSURE: 114 MMHG

## 2019-07-18 LAB
ALBUMIN SERPL-MCNC: 4.1 GM/DL (ref 3.2–4.5)
ALP SERPL-CCNC: 66 U/L (ref 40–136)
ALT SERPL-CCNC: 22 U/L (ref 0–55)
BASOPHILS # BLD AUTO: 0 10^3/UL (ref 0–0.1)
BASOPHILS NFR BLD AUTO: 1 % (ref 0–10)
BILIRUB SERPL-MCNC: 0.5 MG/DL (ref 0.1–1)
BUN/CREAT SERPL: 17
CALCIUM SERPL-MCNC: 9.5 MG/DL (ref 8.5–10.1)
CHLORIDE SERPL-SCNC: 105 MMOL/L (ref 98–107)
CO2 SERPL-SCNC: 22 MMOL/L (ref 21–32)
CREAT SERPL-MCNC: 0.84 MG/DL (ref 0.6–1.3)
EOSINOPHIL # BLD AUTO: 0.2 10^3/UL (ref 0–0.3)
EOSINOPHIL NFR BLD AUTO: 2 % (ref 0–10)
ERYTHROCYTE [DISTWIDTH] IN BLOOD BY AUTOMATED COUNT: 13.7 % (ref 10–14.5)
GFR SERPLBLD BASED ON 1.73 SQ M-ARVRAT: > 60 ML/MIN
GLUCOSE SERPL-MCNC: 97 MG/DL (ref 70–105)
HCT VFR BLD CALC: 40 % (ref 35–52)
HGB BLD-MCNC: 12.7 G/DL (ref 11.5–16)
LYMPHOCYTES # BLD AUTO: 1.4 X 10^3 (ref 1–4)
LYMPHOCYTES NFR BLD AUTO: 19 % (ref 12–44)
MANUAL DIFFERENTIAL PERFORMED BLD QL: NO
MCH RBC QN AUTO: 28 PG (ref 25–34)
MCHC RBC AUTO-ENTMCNC: 32 G/DL (ref 32–36)
MCV RBC AUTO: 89 FL (ref 80–99)
MONOCYTES # BLD AUTO: 0.6 X 10^3 (ref 0–1)
MONOCYTES NFR BLD AUTO: 8 % (ref 0–12)
NEUTROPHILS # BLD AUTO: 5.3 X 10^3 (ref 1.8–7.8)
NEUTROPHILS NFR BLD AUTO: 70 % (ref 42–75)
PLATELET # BLD: 185 10^3/UL (ref 130–400)
PMV BLD AUTO: 9.5 FL (ref 7.4–10.4)
POTASSIUM SERPL-SCNC: 3.8 MMOL/L (ref 3.6–5)
PROT SERPL-MCNC: 6.6 GM/DL (ref 6.4–8.2)
SODIUM SERPL-SCNC: 140 MMOL/L (ref 135–145)
WBC # BLD AUTO: 7.6 10^3/UL (ref 4.3–11)

## 2019-07-18 RX ADMIN — LACTULOSE SCH GM: 20 SOLUTION ORAL at 20:53

## 2019-07-18 RX ADMIN — VENLAFAXINE HYDROCHLORIDE SCH MG: 75 CAPSULE, EXTENDED RELEASE ORAL at 06:52

## 2019-07-18 RX ADMIN — ENOXAPARIN SODIUM SCH MG: 100 INJECTION SUBCUTANEOUS at 13:20

## 2019-07-18 RX ADMIN — LORAZEPAM SCH MG: 0.5 TABLET ORAL at 13:25

## 2019-07-18 RX ADMIN — MEMANTINE HYDROCHLORIDE SCH MG: 10 TABLET ORAL at 20:50

## 2019-07-18 RX ADMIN — DOCUSATE SODIUM SCH MG: 100 CAPSULE ORAL at 20:51

## 2019-07-18 RX ADMIN — RISPERIDONE SCH MG: 0.25 TABLET, FILM COATED ORAL at 07:50

## 2019-07-18 RX ADMIN — LATANOPROST SCH DROP: 50 SOLUTION/ DROPS OPHTHALMIC at 21:02

## 2019-07-18 RX ADMIN — POLYETHYLENE GLYCOL (3350) SCH GM: 17 POWDER, FOR SOLUTION ORAL at 07:52

## 2019-07-18 RX ADMIN — Medication SCH EA: at 06:53

## 2019-07-18 RX ADMIN — DOCUSATE SODIUM AND SENNOSIDES SCH EA: 8.6; 5 TABLET, FILM COATED ORAL at 20:51

## 2019-07-18 RX ADMIN — LORAZEPAM SCH MG: 0.5 TABLET ORAL at 07:52

## 2019-07-18 RX ADMIN — LORAZEPAM SCH MG: 0.5 TABLET ORAL at 20:51

## 2019-07-18 RX ADMIN — OMEGA-3 FATTY ACIDS CAP 1000 MG SCH MG: 1000 CAP at 07:52

## 2019-07-18 RX ADMIN — LACTULOSE SCH GM: 20 SOLUTION ORAL at 07:50

## 2019-07-18 RX ADMIN — MEMANTINE HYDROCHLORIDE SCH MG: 10 TABLET ORAL at 07:51

## 2019-07-18 RX ADMIN — RISPERIDONE SCH MG: 0.25 TABLET, FILM COATED ORAL at 20:51

## 2019-07-18 RX ADMIN — DOCUSATE SODIUM AND SENNOSIDES SCH EA: 8.6; 5 TABLET, FILM COATED ORAL at 07:51

## 2019-07-18 RX ADMIN — VENLAFAXINE HYDROCHLORIDE SCH MG: 75 TABLET ORAL at 13:20

## 2019-07-18 RX ADMIN — LOSARTAN POTASSIUM SCH MG: 50 TABLET, FILM COATED ORAL at 07:51

## 2019-07-18 RX ADMIN — DOCUSATE SODIUM SCH MG: 100 CAPSULE ORAL at 07:52

## 2019-07-18 RX ADMIN — POLYETHYLENE GLYCOL (3350) SCH GM: 17 POWDER, FOR SOLUTION ORAL at 20:53

## 2019-07-18 RX ADMIN — SIMVASTATIN SCH MG: 20 TABLET, FILM COATED ORAL at 20:51

## 2019-07-18 RX ADMIN — ASPIRIN SCH MG: 81 TABLET ORAL at 20:50

## 2019-07-18 RX ADMIN — DONEPEZIL HYDROCHLORIDE SCH MG: 5 TABLET, FILM COATED ORAL at 07:51

## 2019-07-18 RX ADMIN — DONEPEZIL HYDROCHLORIDE SCH MG: 5 TABLET, FILM COATED ORAL at 20:51

## 2019-07-18 RX ADMIN — OXYCODONE HYDROCHLORIDE AND ACETAMINOPHEN PRN TAB: 5; 325 TABLET ORAL at 05:34
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
## 2019-07-18 NOTE — PHYSICAL THERAPY DAILY NOTE
PT Daily Note-Current


Subjective


Pt asleep in bed upon arrival.  Pt's daughter is present, reported pt was 

restless last night.  Pt agrees to PT but demonstrates confusion.





Pain





   Numeric Pain Scale:  4


   Location:  Left


   Location Body Site:  Hip


   Pain Description:  Ache





Mental Status


Patient Orientation:  Person, Confused





Transfers


Therapy Code Descriptions/Definitions 





Functional [place] Measure:


0=Not Assessed/NA        4=Minimal Assistance


1=Total Assistance        5=Supervision or Setup


2=Maximal Assistance  6=Modified [place]


3=Moderate Assistance 7=Complete [place]








Therapy Quality Codes:


6    Independent with activity with or without an assistive device


5    Patient requires set up or clean up by helper.  Patient completes activity 

by  themselves


4    Supervision or touching assist (CGA). [place] provide cues , steadying 

assist


3    The helper provides less than half the effort to complete the activity


2    The helper provides more than half the effort to complete the activity


1    Dependent.  The helper does all the effort to complete an activity 


7    Patient refused to complete or attempt activity


9    The patient did not perform the activity before the current illness or 

injury


88  Not attempted due to Medical conditions or safety concerns


Scootin


Rollin


Roll Left to Right (QC):  4


Supine to/from Sit:  4


Sit to/from Stand:  4


Sit to Lying (QC):  4


Sit to Stand (QC):  4





Weight Bearing


Full Weight Bearing


Full Weight Bearing





Gait Training


Does the Patient Walk?:  Yes


Gait (FIM):  4


Distance (FIM):  3=150 ft (150')


Distance:  150'


Walk 10 feet (QC):  4


Walk 50 ft with 2 Turns(QC):  4


Walk 150 ft (QC):  4


Gait Level of Assist:  4


Gait Persons Needed:  1


Gait Assistive Device:  FWW





Wheelchair Training


Does the Pt Use a Wheelchair?:  No





Exercises


NuStep Minutes:  10


 NuStep Workload:  4





Treatments


PTA explains to pt & daughter about ARU expectations including ordering/eating 

breakfast before start of tx.  Pt transfers to standing and ambulates in 

hallway.  Pt uses NuStep for 10m at WL 4.  Pt returns to room at end of tx with 

all needs met.  Pt resting in recliner with call light in hand.





Assessment


Current Status:  Good Progress


Pt tolerates tx well although remains confused due to Dementia.





PT Short Term Goals


Short Term Goals


Time Frame:  2019


Transfers (B,C,W/C) (FIM):  5


Gait (FIM):  5





PT Long Term Goals


Long Term Goals


PT Long Term Goals Time Frame:  2019


Transfers (B,C,W/C) (FIM):  7


Sit to Lying (QC):  6


Lying-Sitting on Side/Bed(QC):  6


Sit to Stand (QC):  6


Roll Left to Right (QC):  6


Chair/Bed-to-Chair Xfer(QC):  6


Car Transfer (QC):  6


Does the Patient Walk:  Yes


Gait (FIM):  6


Gait distance (FIM):  3=150 ft


Walk 10 feet (QC):  6


Walk 10ft-Uneven Surface(QC):  6


Walk 50ft with 2 Turns (QC):  6


Walk 150 ft (QC):  6


Gait Assistive Device:  FWW


Does the Pt use WC or Scooter?:  No


Stairs (FIM):  5


# of Steps:  4


1 Step (curb) (QC):  6


4 Steps (QC):  6


12 Steps (QC):  88


Picking up an Object (QC):  88





PT Plan


Problem List


Problem List:  Activity Tolerance, Functional Strength, Safety, Balance, Gait, 

Transfer





Treatment/Plan


Treatment Plan:  Continue Plan of Care


Treatment Plan:  Bed Mobility, Education, Functional Activity [place], Functional 

Strength, Group Therapy, Gait, Safety, Therapeutic Exercise, Transfers


Treatment Duration:  2019


Frequency:  At least 5 of 7 days/Wk (IRF)


Estimated Hrs Per Day:  1.5 hours per day


Patient and/or Family Agrees t:  Yes





Safety Risks/Education


Patient Education:  Gait Training, Transfer Techniques, Correct Positioning, 

Safety Issues


Teaching Recipient:  Patient


Teaching Methods:  Discussion


Response to Teaching:  Reinforcement Needed





Time/GCodes


Time In:  815


Time Out:  900


Total Billed Treatment Time:  45


Total Billed Treatment


1, GT (15m), EX (15m) & FA (15m)


G Codes Necessary:  No











FAUSTINO CHOW PTA              2019 09:16

## 2019-07-18 NOTE — PHYSICAL THERAPY DAILY NOTE
PT Daily Note-Current


Subjective


Pt sitting in recliner upon arrival.  Pt agrees to PT.





Pain





   Numeric Pain Scale:  4


   Location Body Site:  Chest


   Pain Description:  Ache, Tightness


   Comment:  Pt reports tightness in chest/ribs when breathing.





Mental Status


Patient Orientation:  Person, Confused





Transfers


Therapy Code Descriptions/Definitions 





Functional [place] Measure:


0=Not Assessed/NA        4=Minimal Assistance


1=Total Assistance        5=Supervision or Setup


2=Maximal Assistance  6=Modified [place]


3=Moderate Assistance 7=Complete [place]








Therapy Quality Codes:


6    Independent with activity with or without an assistive device


5    Patient requires set up or clean up by helper.  Patient completes activity 

by  themselves


4    Supervision or touching assist (CGA). [place] provide cues , steadying 

assist


3    The helper provides less than half the effort to complete the activity


2    The helper provides more than half the effort to complete the activity


1    Dependent.  The helper does all the effort to complete an activity 


7    Patient refused to complete or attempt activity


9    The patient did not perform the activity before the current illness or 

injury


88  Not attempted due to Medical conditions or safety concerns


Scootin


Sit to/from Stand:  4


Sit to Stand (QC):  4





Weight Bearing


Full Weight Bearing


Full Weight Bearing





Gait Training


Does the Patient Walk?:  Yes


Gait (FIM):  4


Distance (FIM):  3=150 ft


Distance:  150'


Walk 10 feet (QC):  4


Walk 50 ft with 2 Turns(QC):  4


Walk 150 ft (QC):  4


Gait Level of Assist:  4


Gait Persons Needed:  1


Gait Assistive Device:  FWW





Wheelchair Training


Does the Pt Use a Wheelchair?:  No





Exercises


Seated Therapy Exercises:  Ankle pumps, Long arc quads, Hip flexion, Kicking 

activity


Seated Reps:  20





Treatments


Pt transfers to standing and ambulates in hallway.  After short RB, pt completes

Seated Ex.  Pt returns to room at end of tx to rest in recliner with all needs 

met, call light in hand.





Assessment


Current Status:  Fair Progress


Pt becomes more confused in afternoon.  Pt demonstrates some [place] 

behaviors.





PT Short Term Goals


Short Term Goals


Time Frame:  2019


Transfers (B,C,W/C) (FIM):  5


Gait (FIM):  5





PT Long Term Goals


Long Term Goals


PT Long Term Goals Time Frame:  2019


Transfers (B,C,W/C) (FIM):  7


Sit to Lying (QC):  6


Lying-Sitting on Side/Bed(QC):  6


Sit to Stand (QC):  6


Rollin


Roll Left to Right (QC):  6


Chair/Bed-to-Chair Xfer(QC):  6


Car Transfer (QC):  6


Does the Patient Walk:  Yes


Gait (FIM):  6


Gait distance (FIM):  3=150 ft


Walk 10 feet (QC):  6


Walk 10ft-Uneven Surface(QC):  6


Walk 50ft with 2 Turns (QC):  6


Walk 150 ft (QC):  6


Gait Assistive Device:  FWW


Does the Pt use WC or Scooter?:  No


Stairs (FIM):  5


# of Steps:  4


1 Step (curb) (QC):  6


4 Steps (QC):  6


12 Steps (QC):  88


Picking up an Object (QC):  88





PT Plan


Problem List


Problem List:  Activity Tolerance, Functional Strength, Safety, Balance, Gait, 

Transfer





Treatment/Plan


Treatment Plan:  Continue Plan of Care


Treatment Plan:  Bed Mobility, Education, Functional Activity [place], Functional 

Strength, Group Therapy, Gait, Safety, Therapeutic Exercise, Transfers


Treatment Duration:  2019


Frequency:  At least 5 of 7 days/Wk (IRF)


Estimated Hrs Per Day:  1.5 hours per day


Patient and/or Family Agrees t:  Yes





Safety Risks/Education


Patient Education:  Gait Training, Transfer Techniques, Correct Positioning, 

Safety Issues


Teaching Recipient:  Patient


Teaching Methods:  Discussion


Response to Teaching:  Reinforcement Needed





Time/GCodes


Time In:  1430


Time Out:  1500


Total Billed Treatment Time:  30


Total Billed Treatment


1, GT (15m) & EX (15m)


G Codes Necessary:  FAUSTINO Conte PTA              2019 15:40
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
## 2019-07-18 NOTE — SPEECH THERAPY DAILY NOTE
Speech Daily Progress Note


Subjective


Date Seen by Provider:  2019


Time Seen by Provider:  00:30


The patient was resting in her recliner following her PT and OT.





Objective


The patient completed general memory tasks with 75% accuracy with mod to max 

verbal cues and/or repetitions.





Assessment


Assessment Current Status:  Fair Progress





Treatment Plan


Continue Plan of Care





Communication


Comprehension:  5


Expression:  4





Social Cognition


Social Interaction:  6


Problem Solving:  3


Memory:  3





Speech Short Term Goals


Short Term Goals


Short Term Goals


1) The patient will complete memory tasks with 80% or greater with minimal cues.


2) The patient will complete problem solving tasks with 80% or greater with 

minimal cues.


3) The patient will complete safety awareness tasks with 80% or greater with 

minimal cues.





Speech Long Term Goals


Long Term Goals


The patient will improve her safety awareness and independence in order to 

return home safely with her .


Comprehension:  5


Expression:  5


Social Interaction:  5


Problem Solvin


Memory:  5





Speech-Plan


Patient/Family Goals


Patient/Family Goals:  


The patient plans on returning home with her  post rehab.





Treatment Plan


Speech Therapy Treatment Plan:  Continue Plan of Care


The patient was more alert today.


Treatment Duration:  2019


Frequency:  5 times per week


Estimated Hrs Per Day:  .5 hour per day


Rehab Potential:  Good


Barriers to Learning:  


Patient has Alzheimer's


Pt/Family Agrees to Plan:  Yes





Safety Risks/Education


Teaching Recipient:  Patient, Family


Teaching Methods:  Demonstration, Discussion


Response to Teaching:  Verbalize Understanding, Return Demonstration


Education Topics Provided:  


Continued safety within her room.





Time


Speech Therapy Time In:  10:45


Speech Therapy Time Out:  11:15


Total Billed Time:  30


Billed Treatment Time


1EPI [address]            2019 11:29
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
## 2019-07-18 NOTE — OCCUPATIONAL THER DAILY NOTE
OT Current Status-Daily Note


Subjective


Pt alert, sitting in recliner.  Daughter present in room.  Pt agrees to therapy.





Mental Status/Objective


Patient Orientation:  Person, Place


Therapy Code Descriptions/Definitions 





Functional [place] Measure:


0=Not Assessed/NA        4=Minimal Assistance


1=Total Assistance        5=Supervision or Setup


2=Maximal Assistance  6=Modified [place]


3=Moderate Assistance 7=Complete [place]


Attachments:  IV





ADL-Treatment


Sit<-->stand throughout session, min A.  Min A for transfers, pt tends to 'plop'

backwards and verbal cues needed for safe transfer.  Min A for toilet transfer, 

CGA for toileting using FWW and grabbar.  Pt doffed briefs with CGA in standing.

 Educated pt on dressing stick to doff socks, will need continued prompts due to

confusion.  CGA to transfer into/out of shower using FWW, grabbars and shower 

bench.  Using long handle sponge, grabbars, shower bench and hand held shower pt

required CGA in standing and verbal cues to use equipment needed to bath and dry

self.  Min A for donning shirt, pulling down in back, and bra, assist to po

sition correctly.  Mod A for donning lower body clothing, pt able to thread one 

foot then assist to thread the other with assist to hike underpants then pt able

to hike pants.  Education on sock aide, assist for set up, to pull sock on, will

need continued cues.  Assist to don shoes.  Sitting at sink pt complete most of 

grooming by self then pt wanted to stand at sink to rinse after brushing and to 

brush hair, close SBA.  Pt then ambulated back to recliner.  After therapy, pt 

sitting in recliner with call light/phone in reach.  All needs met in room.  

Daughter present in room.


Therapy Code Descriptions/Definitions 





Functional [place] Measure:


0=Not Assessed/NA        4=Minimal Assistance


1=Total Assistance        5=Supervision or Setup


2=Maximal Assistance  6=Modified [place]


3=Moderate Assistance 7=Complete [place]








Therapy Quality Codes:


6    Independent with activity with or without an assistive device


5    Patient requires set up or clean up by helper.  Patient completes activity 

by  themselves


4    Supervision or touching assist (CGA). [place] provide cues , steadying 

assist


3    The helper provides less than half the effort to complete the activity


2    The helper provides more than half the effort to complete the activity


1    Dependent.  The helper does all the effort to complete an activity 


7    Patient refused to complete or attempt activity


9    The patient did not perform the activity before the current illness or 

injury


88  Not attempted due to Medical conditions or safety concerns


Grooming (FIM):  5


Oral Hygiene (QC):  5


Bathing (FIM):  4


Bathing Location:  L Arm, R Arm, L Upper Leg, R Upper Leg, L Lower Leg 

(including foot), R Lower Leg (including foot), Chest, Abdomen, Buttocks, 

Perineal Area


Shower/Bathe Self (QC):  3


Upper Body (FIM):  4


Upper Body Dressing (QC):  3


Lower Body Dressing (FIM):  3


Lower Body Dressing (QC):  2


On/Off Footwear (QC):  2


Toileting (FIM):  4


Toileting Hygiene (QC):  3


Transfers (B, C, W/C) (FIM):  4


Toilet/Commode Transfer (FIM):  4


Toilet Transfer (QC):  3


Shower Transfer(FIM):  4





OT Short Term Goals


Short Term Goals


Transfers (B,C,W/C) (FIM):  5


1=Demonstrate adherence to instructed precautions during ADL tasks.


2=Patient will verbalize/demonstrate understanding of assistive 

devices/modifications for ADL.


3=Patient will improve strength/tolerance for activity to enable patient to 

perform ADL's.





OT Long Term Goals


Long Term Goals


Time Frame:  2019


Eating (FIM):  6


Eating (QC):  6


Groomin


Oral Hygiene (QC):  6


Bathing(FIM):  6


Shower/Bathe Self (QC):  5


Upper Body Dressing(FIM):  6


Upper Body Dressing (QC):  5


Lower Body Dressing(FIM):  6


Lower Body Dressing (QC):  5


On/Off Footwear (QC):  5


Toileting(FIM):  6


Toileting Hygiene (QC):  6


Transfers (B,C,W/C) (FIM):  6


Toilet/Commode Transfer(FIM):  6


Toilet/Commode Transfer (QC):  6


Shower Transfer(FIM):  5


Comprehension(FIM):  5


Expression (FIM):  5


Social Interaction(FIM):  5


Problem Solving(FIM):  5


Memory(FIM):  5


Additional Goals:  1-Demonstrate ADL Tasks, 2-Verbalize Understanding, 

3-ImproveStrength/[place]


1=Demonstrate adherence to instructed precautions during ADL tasks.


2=Patient will verbalize/demonstrate understanding of assistive 

devices/modifications for ADL.


3=Patient will improve strength/tolerance for activity to enable patient to 

perform ADL's.





OT Education/Plan


Problem List/Assessment


Assessment:  Decreased Activ Tolerance, Decreased Safety Aware, Impaired 

Cognition, Impaired Coordination, Impaired Self-Care Skills





Discharge Recommendations


Plan/Recommendations:  Continue POC





Treatment Plan/Plan of Care


Patient would benefit from OT for education, treatment and training to promote 

independence in ADL's, mobility, safety and/or upper extremity function for 

ADL's.


Plan of Care:  ADL Retraining, Caregiver Training, Functional Mobility, Group 

Exercise/Act as Ind, UE Funct Exercise/Act


Treatment Duration:  2019


Frequency:  At least 5 of 7 days/Wk (IRF)


Estimated Hrs Per Day:  1.5 hours per day


Agreement:  Yes


Rehab Potential:  Good





Time/GCodes


Start Time:  09:30


Stop Time:  10:45


Total Time Billed (hr/min):  75


Billed Treatment Time


1 visit-ADL 5 (75 min)











GUSTAVO NOBLE               2019 11:04

## 2019-07-19 VITALS — SYSTOLIC BLOOD PRESSURE: 128 MMHG | DIASTOLIC BLOOD PRESSURE: 72 MMHG

## 2019-07-19 VITALS — SYSTOLIC BLOOD PRESSURE: 145 MMHG | DIASTOLIC BLOOD PRESSURE: 78 MMHG

## 2019-07-19 VITALS — DIASTOLIC BLOOD PRESSURE: 85 MMHG | SYSTOLIC BLOOD PRESSURE: 169 MMHG

## 2019-07-19 RX ADMIN — OMEGA-3 FATTY ACIDS CAP 1000 MG SCH MG: 1000 CAP at 09:35

## 2019-07-19 RX ADMIN — MEMANTINE HYDROCHLORIDE SCH MG: 10 TABLET ORAL at 20:46

## 2019-07-19 RX ADMIN — DOCUSATE SODIUM AND SENNOSIDES SCH EA: 8.6; 5 TABLET, FILM COATED ORAL at 09:36

## 2019-07-19 RX ADMIN — SIMVASTATIN SCH MG: 20 TABLET, FILM COATED ORAL at 20:45

## 2019-07-19 RX ADMIN — LACTULOSE SCH GM: 20 SOLUTION ORAL at 09:35

## 2019-07-19 RX ADMIN — LOSARTAN POTASSIUM SCH MG: 50 TABLET, FILM COATED ORAL at 09:35

## 2019-07-19 RX ADMIN — DOCUSATE SODIUM SCH MG: 100 CAPSULE ORAL at 20:45

## 2019-07-19 RX ADMIN — VENLAFAXINE HYDROCHLORIDE SCH MG: 75 TABLET ORAL at 14:04

## 2019-07-19 RX ADMIN — LORAZEPAM SCH MG: 0.5 TABLET ORAL at 14:04

## 2019-07-19 RX ADMIN — DONEPEZIL HYDROCHLORIDE SCH MG: 5 TABLET, FILM COATED ORAL at 20:45

## 2019-07-19 RX ADMIN — POLYETHYLENE GLYCOL (3350) SCH GM: 17 POWDER, FOR SOLUTION ORAL at 20:44

## 2019-07-19 RX ADMIN — DOCUSATE SODIUM AND SENNOSIDES SCH EA: 8.6; 5 TABLET, FILM COATED ORAL at 20:46

## 2019-07-19 RX ADMIN — LATANOPROST SCH DROP: 50 SOLUTION/ DROPS OPHTHALMIC at 20:44

## 2019-07-19 RX ADMIN — LORAZEPAM SCH MG: 0.5 TABLET ORAL at 20:45

## 2019-07-19 RX ADMIN — POLYETHYLENE GLYCOL (3350) SCH GM: 17 POWDER, FOR SOLUTION ORAL at 09:33

## 2019-07-19 RX ADMIN — DOCUSATE SODIUM SCH MG: 100 CAPSULE ORAL at 09:36

## 2019-07-19 RX ADMIN — LACTULOSE SCH GM: 20 SOLUTION ORAL at 20:44

## 2019-07-19 RX ADMIN — RISPERIDONE SCH MG: 0.25 TABLET, FILM COATED ORAL at 20:45

## 2019-07-19 RX ADMIN — ENOXAPARIN SODIUM SCH MG: 100 INJECTION SUBCUTANEOUS at 14:04

## 2019-07-19 RX ADMIN — VENLAFAXINE HYDROCHLORIDE SCH MG: 75 CAPSULE, EXTENDED RELEASE ORAL at 06:06

## 2019-07-19 RX ADMIN — DONEPEZIL HYDROCHLORIDE SCH MG: 5 TABLET, FILM COATED ORAL at 09:35

## 2019-07-19 RX ADMIN — Medication SCH EA: at 06:06

## 2019-07-19 RX ADMIN — RISPERIDONE SCH MG: 0.25 TABLET, FILM COATED ORAL at 09:35

## 2019-07-19 RX ADMIN — ASPIRIN SCH MG: 81 TABLET ORAL at 20:45

## 2019-07-19 RX ADMIN — MEMANTINE HYDROCHLORIDE SCH MG: 10 TABLET ORAL at 09:35

## 2019-07-19 RX ADMIN — LORAZEPAM SCH MG: 0.5 TABLET ORAL at 09:35

## 2019-07-19 NOTE — SPEECH THERAPY DAILY NOTE
Speech Daily Progress Note


Subjective


Date Seen by Provider:  2019


Time Seen by Provider:  00:30


The patient was resting in her bed this afternoon when I entered her room.





Objective


The patient completed a series of memory tasks related to her daily routine with

70% accuracy given mod to max verbal and/or visual cues.





Assessment


Assessment Current Status:  Fair Progress





Treatment Plan


Continue Plan of Care





Communication


Comprehension:  5


Expression:  4





Social Cognition


Social Interaction:  6


Problem Solving:  3


Memory:  3





Speech Short Term Goals


Short Term Goals


Short Term Goals


1) The patient will complete memory tasks with 80% or greater with minimal cues.


2) The patient will complete problem solving tasks with 80% or greater with 

minimal cues.


3) The patient will complete safety awareness tasks with 80% or greater with 

minimal cues.





Speech Long Term Goals


Long Term Goals


The patient will improve her safety awareness and independence in order to 

return home safely with her .


Comprehension:  5


Expression:  5


Social Interaction:  5


Problem Solvin


Memory:  5





Speech-Plan


Patient/Family Goals


Patient/Family Goals:  


The patient plans on returning home with her  post rehab.





Treatment Plan


Speech Therapy Treatment Plan:  Continue Plan of Care


The patient is always pleasant and cooperative.


Treatment Duration:  2019


Frequency:  5 times per week


Estimated Hrs Per Day:  .5 hour per day


Rehab Potential:  Good


Barriers to Learning:  


Alzheimer's diagnosis


Pt/Family Agrees to Plan:  Yes





Safety Risks/Education


Teaching Recipient:  Patient


Teaching Methods:  Demonstration, Discussion


Response to Teaching:  Verbalize Understanding, Return Demonstration, 

Reinforcement Needed


Education Topics Provided:  


Continued safety within her room, discussed the alarms that sound when she


tries to get up.





Time


Speech Therapy Time In:  14:30


Speech Therapy Time Out:  15:00


Total Billed Time:  30


Billed Treatment Time


1EPI [address] ST            2019 15:44

## 2019-07-19 NOTE — THERAPY GROUP DAILY NOTE
Therapy Daily Group Note


Patient Education Topic


Other List Below (ARU description/expectations)





Exercises


LE Seated Exercise, UE Exercise





Session


Ratio (pt:therapist):  4:1


Goal of Session:  Education on ARU Expectations, UE/LE Strengthing


Goal Met for this Session:  Yes


Pt Benefit of Group:  Contributions to Others, Increased Functional Strength, 

Improved Cognition, Recognition of Peers, Socialization





Other/Notes


Pt. participated in group PT OT session this date.  Pt. ambulated using FWW to 

and from group.  Pt required reorientation of being a pt at [facility] [[email or web address]], pt was adamant 

that she was not a pt she was just waiting on her daughter.  Pt. was pleasant, 

introduced self and participated in pt. lead exercises from illustrated exercise

cards. Pts. rolled large foam dice to determine the # of reps the exercise they 

demonstrated would require.  Pt's were jovial sharing their names, home town as 

well as their favorite refreshing drink.  ARU education was covered with regard 

to expectations , schedule, 3 hr requirement and goals.  Pt in room after group,

lying in bed.  Call light/phone in reach.  All needs met in room.


Start Time:  12:45


Stop Time:  14:00


Total Billed Treatment Time:  75


Total Billed Treatment


1-GRP











GUSTAVO NOBLE               Jul 19, 2019 16:00

## 2019-07-19 NOTE — PHYSICAL THERAPY DAILY NOTE
PT Daily Note-Current


Subjective


Pt. and daughter share their living situation and great supportive relationship.

Pt. denies pain until supine exercises then has min pain left back in to left 

leg





Pain





   Numeric Pain Scale:  4


   Location:  Left


   Location Body Site:  Back


   Pain Description:  Ache





Mental Status


Patient Orientation:  Person, Place, Time, Situation





Transfers


Therapy Code Descriptions/Definitions 





Functional [place] Measure:


0=Not Assessed/NA        4=Minimal Assistance


1=Total Assistance        5=Supervision or Setup


2=Maximal Assistance  6=Modified [place]


3=Moderate Assistance 7=Complete [place]








Therapy Quality Codes:


6    Independent with activity with or without an assistive device


5    Patient requires set up or clean up by helper.  Patient completes activity 

by  themselves


4    Supervision or touching assist (CGA). [place] provide cues , steadying 

assist


3    The helper provides less than half the effort to complete the activity


2    The helper provides more than half the effort to complete the activity


1    Dependent.  The helper does all the effort to complete an activity 


7    Patient refused to complete or attempt activity


9    The patient did not perform the activity before the current illness or 

injury


88  Not attempted due to Medical conditions or safety concerns


Transfers (B, C, W/C) (FIM):  5


Scootin


Rollin


Supine to/from Sit:  5


Sit to/from Stand:  5


Bed to/from Chair:  5





Weight Bearing


Full Weight Bearing


Full Weight Bearing





Gait Training


Does the Patient Walk?:  Yes


Gait (FIM):  5


Distance (FIM):  3=150 ft (165x2)


Gait Level of Assist:  5


Gait Persons Needed:  1


Gait Assistive Device:  FWW


needs direction and instruction to look up etc





Stair Training


 Stair Training: Handrails/:  2 handrails


Stairs (FIM):  5


#of Steps:  4


Stairs:  Pattern:  Reciprocal


Level of Assist:  5


household exception





Exercises


Supine Ex:  Ankle pumps, Quad Set, Rolling, Glut sets, Heel Slides, Short Arc 

Quads, Scooting, Hip abd/add


Supine Reps:  15


Seated Therapy Exercises:  Ankle pumps, Sit to stand, Long arc quads, Hip 

flexion


Seated Reps:  15


NuStep Minutes:  10


 NuStep Workload:  3





Assessment


Current Status:  Good Progress





PT Short Term Goals


Short Term Goals


Time Frame:  2019


Transfers (B,C,W/C) (FIM):  5


Gait (FIM):  5





PT Long Term Goals


Long Term Goals


PT Long Term Goals Time Frame:  2019


Transfers (B,C,W/C) (FIM):  7


Sit to Lying (QC):  6


Lying-Sitting on Side/Bed(QC):  6


Sit to Stand (QC):  6


Rollin


Roll Left to Right (QC):  6


Chair/Bed-to-Chair Xfer(QC):  6


Car Transfer (QC):  6


Does the Patient Walk:  Yes


Gait (FIM):  6


Gait distance (FIM):  3=150 ft


Walk 10 feet (QC):  6


Walk 10ft-Uneven Surface(QC):  6


Walk 50ft with 2 Turns (QC):  6


Walk 150 ft (QC):  6


Gait Assistive Device:  FWW


Does the Pt use WC or Scooter?:  No


Stairs (FIM):  5


# of Steps:  4


1 Step (curb) (QC):  6


4 Steps (QC):  6


12 Steps (QC):  88


Picking up an Object (QC):  88





PT Plan


Treatment/Plan


Treatment Plan:  Continue Plan of Care


Treatment Plan:  Bed Mobility, Education, Functional Activity [place], Functional 

Strength, Group Therapy, Gait, Safety, Therapeutic Exercise, Transfers


Treatment Duration:  2019


Frequency:  At least 5 of 7 days/Wk (IRF)


Estimated Hrs Per Day:  1.5 hours per day


Patient and/or Family Agrees t:  Yes





Safety Risks/Education


Patient Education:  Gait Training, Transfer Techniques, Steps, Correct 

Positioning, Disease Process, Safety Issues


Teaching Recipient:  Patient


Teaching Methods:  Demonstration, Discussion


Response to Teaching:  Verbalize Understanding, Return Demonstration, 

Reinforcement Needed





Time/GCodes


Time In:  1115


Time Out:  1200


Total Billed Treatment Time:  45


Total Billed Treatment


1,Gt15m,EX20m,FA10m


G Codes Necessary:  FATOUMATA Montoya PTA             2019 12:04
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
## 2019-07-19 NOTE — OCCUPATIONAL THER DAILY NOTE
OT Current Status-Daily Note


Subjective


Pt sleeping in bed, woke to name.  Pt confused on where she is, how she got 

here, why she is here and if her  knows that she is here.  KEANE 

reoriented pt throughout therapy.  Pt stated the she feels like her head is in 

the clouds.  Pt pleasant and continues to ask questions to orient herself.





Mental Status/Objective


Patient Orientation:  Person


Therapy Code Descriptions/Definitions 





Functional [place] Measure:


0=Not Assessed/NA        4=Minimal Assistance


1=Total Assistance        5=Supervision or Setup


2=Maximal Assistance  6=Modified [place]


3=Moderate Assistance 7=Complete [place]


Attachments:  IV





ADL-Treatment


Pt declines shower, agrees to sponge bath.  After set up, pt completed sponge 

bath sitting on toilet with supervision to help pt sequence steps due to 

confusion this morning.  Pt does not remember using AE for lower body dressing, 

reoriented pt to equipment.  Pt completed toilet transfer with CGA and CGA for 

toileting when standing to hike pants over hips.  Min A to don shirt, assist to 

pull shirt down.  Pt attempted to don pants over feet, assist to get pant leg 

over toes then pt able to complete the rest with CGA.  Pt c/o back pain 

throughout session, reported to nrsg.  Pt able to complete grooming 

sitting/standing at sink with close SBA.  After therapy, pt lying in bed with 

call light/phone in reach.  All needs met in room.


Therapy Code Descriptions/Definitions 





Functional [place] Measure:


0=Not Assessed/NA        4=Minimal Assistance


1=Total Assistance        5=Supervision or Setup


2=Maximal Assistance  6=Modified [place]


3=Moderate Assistance 7=Complete [place]








Therapy Quality Codes:


6    Independent with activity with or without an assistive device


5    Patient requires set up or clean up by helper.  Patient completes activity 

by  themselves


4    Supervision or touching assist (CGA). [place] provide cues , steadying 

assist


3    The helper provides less than half the effort to complete the activity


2    The helper provides more than half the effort to complete the activity


1    Dependent.  The helper does all the effort to complete an activity 


7    Patient refused to complete or attempt activity


9    The patient did not perform the activity before the current illness or 

injury


88  Not attempted due to Medical conditions or safety concerns


Grooming (FIM):  5


Oral Hygiene (QC):  4


Upper Body (FIM):  4


Upper Body Dressing (QC):  3


Lower Body Dressing (FIM):  3


Lower Body Dressing (QC):  3


On/Off Footwear (QC):  3


Toileting (FIM):  4


Toileting Hygiene (QC):  4


Transfers (B, C, W/C) (FIM):  4


Toilet/Commode Transfer (FIM):  4


Toilet Transfer (QC):  3





OT Short Term Goals


Short Term Goals


Transfers (B,C,W/C) (FIM):  5


1=Demonstrate adherence to instructed precautions during ADL tasks.


2=Patient will verbalize/demonstrate understanding of assistive 

devices/modifications for ADL.


3=Patient will improve strength/tolerance for activity to enable patient to 

perform ADL's.





OT Long Term Goals


Long Term Goals


Time Frame:  2019


Eating (FIM):  6


Eating (QC):  6


Groomin


Oral Hygiene (QC):  6


Bathing(FIM):  6


Shower/Bathe Self (QC):  5


Upper Body Dressing(FIM):  6


Upper Body Dressing (QC):  5


Lower Body Dressing(FIM):  6


Lower Body Dressing (QC):  5


On/Off Footwear (QC):  5


Toileting(FIM):  6


Toileting Hygiene (QC):  6


Transfers (B,C,W/C) (FIM):  6


Toilet/Commode Transfer(FIM):  6


Toilet/Commode Transfer (QC):  6


Shower Transfer(FIM):  5


Comprehension(FIM):  5


Expression (FIM):  5


Social Interaction(FIM):  5


Problem Solving(FIM):  5


Memory(FIM):  5


Additional Goals:  1-Demonstrate ADL Tasks, 2-Verbalize Understanding, 3-

ImproveStrength/[place]


1=Demonstrate adherence to instructed precautions during ADL tasks.


2=Patient will verbalize/demonstrate understanding of assistive 

devices/modifications for ADL.


3=Patient will improve strength/tolerance for activity to enable patient to 

perform ADL's.





OT Education/Plan


Problem List/Assessment


Assessment:  Decreased Activ Tolerance, Impaired Cognition, Impaired Self-Care 

Skills





Discharge Recommendations


Plan/Recommendations:  Continue POC





Treatment Plan/Plan of Care


Patient would benefit from OT for education, treatment and training to promote 

independence in ADL's, mobility, safety and/or upper extremity function for 

ADL's.


Plan of Care:  ADL Retraining, Caregiver Training, Functional Mobility, Group Ex

ercise/Act as Ind, UE Funct Exercise/Act


Treatment Duration:  2019


Frequency:  At least 5 of 7 days/Wk (IRF)


Estimated Hrs Per Day:  1.5 hours per day


Agreement:  Yes


Rehab Potential:  Good





Time/GCodes


Start Time:  08:00


Stop Time:  09:00


Total Time Billed (hr/min):  60


Billed Treatment Time


1 visit-ADL 4 (60 min)











GUSTAVO NOBLE               2019 10:22

## 2019-07-20 VITALS — SYSTOLIC BLOOD PRESSURE: 120 MMHG | DIASTOLIC BLOOD PRESSURE: 67 MMHG

## 2019-07-20 VITALS — SYSTOLIC BLOOD PRESSURE: 129 MMHG | DIASTOLIC BLOOD PRESSURE: 72 MMHG

## 2019-07-20 RX ADMIN — ASPIRIN SCH MG: 81 TABLET ORAL at 20:48

## 2019-07-20 RX ADMIN — Medication SCH EA: at 06:38

## 2019-07-20 RX ADMIN — LORAZEPAM SCH MG: 0.5 TABLET ORAL at 20:47

## 2019-07-20 RX ADMIN — RISPERIDONE SCH MG: 0.25 TABLET, FILM COATED ORAL at 20:47

## 2019-07-20 RX ADMIN — DONEPEZIL HYDROCHLORIDE SCH MG: 5 TABLET, FILM COATED ORAL at 20:48

## 2019-07-20 RX ADMIN — DOCUSATE SODIUM AND SENNOSIDES SCH EA: 8.6; 5 TABLET, FILM COATED ORAL at 20:10

## 2019-07-20 RX ADMIN — LATANOPROST SCH DROP: 50 SOLUTION/ DROPS OPHTHALMIC at 20:47

## 2019-07-20 RX ADMIN — RISPERIDONE SCH MG: 0.25 TABLET, FILM COATED ORAL at 08:39

## 2019-07-20 RX ADMIN — LACTULOSE SCH GM: 20 SOLUTION ORAL at 20:09

## 2019-07-20 RX ADMIN — SIMVASTATIN SCH MG: 20 TABLET, FILM COATED ORAL at 20:48

## 2019-07-20 RX ADMIN — MEMANTINE HYDROCHLORIDE SCH MG: 10 TABLET ORAL at 08:39

## 2019-07-20 RX ADMIN — DOCUSATE SODIUM SCH MG: 100 CAPSULE ORAL at 08:39

## 2019-07-20 RX ADMIN — LACTULOSE SCH GM: 20 SOLUTION ORAL at 08:39

## 2019-07-20 RX ADMIN — LORAZEPAM SCH MG: 0.5 TABLET ORAL at 08:38

## 2019-07-20 RX ADMIN — LOSARTAN POTASSIUM SCH MG: 50 TABLET, FILM COATED ORAL at 08:39

## 2019-07-20 RX ADMIN — MEMANTINE HYDROCHLORIDE SCH MG: 10 TABLET ORAL at 20:48

## 2019-07-20 RX ADMIN — DONEPEZIL HYDROCHLORIDE SCH MG: 5 TABLET, FILM COATED ORAL at 08:38

## 2019-07-20 RX ADMIN — POLYETHYLENE GLYCOL (3350) SCH GM: 17 POWDER, FOR SOLUTION ORAL at 20:09

## 2019-07-20 RX ADMIN — DOCUSATE SODIUM SCH MG: 100 CAPSULE ORAL at 20:09

## 2019-07-20 RX ADMIN — ACETAMINOPHEN PRN MG: 500 TABLET ORAL at 08:43

## 2019-07-20 RX ADMIN — VENLAFAXINE HYDROCHLORIDE SCH MG: 75 CAPSULE, EXTENDED RELEASE ORAL at 06:38

## 2019-07-20 RX ADMIN — LORAZEPAM SCH MG: 0.5 TABLET ORAL at 13:30

## 2019-07-20 RX ADMIN — POLYETHYLENE GLYCOL (3350) SCH GM: 17 POWDER, FOR SOLUTION ORAL at 08:39

## 2019-07-20 RX ADMIN — VENLAFAXINE HYDROCHLORIDE SCH MG: 75 TABLET ORAL at 13:30

## 2019-07-20 RX ADMIN — OMEGA-3 FATTY ACIDS CAP 1000 MG SCH MG: 1000 CAP at 13:29

## 2019-07-20 RX ADMIN — ENOXAPARIN SODIUM SCH MG: 100 INJECTION SUBCUTANEOUS at 13:30

## 2019-07-20 RX ADMIN — DOCUSATE SODIUM AND SENNOSIDES SCH EA: 8.6; 5 TABLET, FILM COATED ORAL at 08:39
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
## 2019-07-20 NOTE — PHYSICAL THERAPY DAILY NOTE
PT Daily Note-Current


Subjective


Pt denies pain.  Pt very pleasant and agreeable throughout treatment session.  

Daughter present.





Mental Status


Patient Orientation:  Person, Place





Transfers


Therapy Code Descriptions/Definitions 





Functional [place] Measure:


0=Not Assessed/NA        4=Minimal Assistance


1=Total Assistance        5=Supervision or Setup


2=Maximal Assistance  6=Modified [place]


3=Moderate Assistance 7=Complete [place]








Therapy Quality Codes:


6    Independent with activity with or without an assistive device


5    Patient requires set up or clean up by helper.  Patient completes activity 

by  themselves


4    Supervision or touching assist (CGA). [place] provide cues , steadying as

sist


3    The helper provides less than half the effort to complete the activity


2    The helper provides more than half the effort to complete the activity


1    Dependent.  The helper does all the effort to complete an activity 


7    Patient refused to complete or attempt activity


9    The patient did not perform the activity before the current illness or 

injury


88  Not attempted due to Medical conditions or safety concerns





Weight Bearing


Full Weight Bearing


Full Weight Bearing





Treatments


Pt seen for ther ex in bed:  AP, QS, Heel slide, SAQ, Hip abd x 20 each.  

Transfers mod (I) all levels.  Pt donned shoes with set up.  Pt amb with FWW and

CGA x 125ft.  Pt back to bed per request with call light.





Assessment


Current Status:  Good Progress


Pt mario very well.  No signs of pain.  Mod (I) mobility. Pt resting with all 

needs met.  Call light in reach.





PT Short Term Goals


Short Term Goals


Time Frame:  2019


Transfers (B,C,W/C) (FIM):  5


Gait (FIM):  5





PT Long Term Goals


Long Term Goals


PT Long Term Goals Time Frame:  2019


Transfers (B,C,W/C) (FIM):  7


Sit to Lying (QC):  6


Lying-Sitting on Side/Bed(QC):  6


Sit to Stand (QC):  6


Rollin


Roll Left to Right (QC):  6


Chair/Bed-to-Chair Xfer(QC):  6


Car Transfer (QC):  6


Does the Patient Walk:  Yes


Gait (FIM):  6


Gait distance (FIM):  3=150 ft


Walk 10 feet (QC):  6


Walk 10ft-Uneven Surface(QC):  6


Walk 50ft with 2 Turns (QC):  6


Walk 150 ft (QC):  6


Gait Assistive Device:  FWW


Does the Pt use WC or Scooter?:  No


Stairs (FIM):  5


# of Steps:  4


1 Step (curb) (QC):  6


4 Steps (QC):  6


12 Steps (QC):  88


Picking up an Object (QC):  88





PT Plan


Treatment/Plan


Treatment Plan:  Continue Plan of Care


Treatment Plan:  Bed Mobility, Education, Functional Activity [place], Functional 

Strength, Group Therapy, Gait, Safety, Therapeutic Exercise, Transfers


Treatment Duration:  2019


Frequency:  At least 5 of 7 days/Wk (IRF)


Estimated Hrs Per Day:  1.5 hours per day


Patient and/or Family Agrees t:  Yes





Time/GCodes


Time In:  840


Time Out:  904


Total Billed Treatment Time:  24


Total Billed Treatment


1, ther ex 14', Gait 10'











ARCHIE MCCLAIN CPTA            2019 13:47
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
## 2019-07-21 VITALS — DIASTOLIC BLOOD PRESSURE: 81 MMHG | SYSTOLIC BLOOD PRESSURE: 144 MMHG

## 2019-07-21 VITALS — DIASTOLIC BLOOD PRESSURE: 76 MMHG | SYSTOLIC BLOOD PRESSURE: 144 MMHG

## 2019-07-21 VITALS — DIASTOLIC BLOOD PRESSURE: 76 MMHG | SYSTOLIC BLOOD PRESSURE: 152 MMHG

## 2019-07-21 RX ADMIN — LACTULOSE SCH GM: 20 SOLUTION ORAL at 08:05

## 2019-07-21 RX ADMIN — LORAZEPAM SCH MG: 0.5 TABLET ORAL at 08:05

## 2019-07-21 RX ADMIN — ACETAMINOPHEN PRN MG: 500 TABLET ORAL at 11:01

## 2019-07-21 RX ADMIN — DOCUSATE SODIUM AND SENNOSIDES SCH EA: 8.6; 5 TABLET, FILM COATED ORAL at 19:45

## 2019-07-21 RX ADMIN — LORAZEPAM SCH MG: 0.5 TABLET ORAL at 13:10

## 2019-07-21 RX ADMIN — LORAZEPAM SCH MG: 0.5 TABLET ORAL at 19:50

## 2019-07-21 RX ADMIN — LATANOPROST SCH DROP: 50 SOLUTION/ DROPS OPHTHALMIC at 19:52

## 2019-07-21 RX ADMIN — DONEPEZIL HYDROCHLORIDE SCH MG: 5 TABLET, FILM COATED ORAL at 08:02

## 2019-07-21 RX ADMIN — RISPERIDONE SCH MG: 0.25 TABLET, FILM COATED ORAL at 08:02

## 2019-07-21 RX ADMIN — MEMANTINE HYDROCHLORIDE SCH MG: 10 TABLET ORAL at 08:03

## 2019-07-21 RX ADMIN — LACTULOSE SCH GM: 20 SOLUTION ORAL at 19:44

## 2019-07-21 RX ADMIN — VENLAFAXINE HYDROCHLORIDE SCH MG: 75 CAPSULE, EXTENDED RELEASE ORAL at 06:40

## 2019-07-21 RX ADMIN — LOSARTAN POTASSIUM SCH MG: 50 TABLET, FILM COATED ORAL at 08:02

## 2019-07-21 RX ADMIN — POLYETHYLENE GLYCOL (3350) SCH GM: 17 POWDER, FOR SOLUTION ORAL at 08:05

## 2019-07-21 RX ADMIN — DOCUSATE SODIUM SCH MG: 100 CAPSULE ORAL at 19:44

## 2019-07-21 RX ADMIN — RISPERIDONE SCH MG: 0.25 TABLET, FILM COATED ORAL at 19:50

## 2019-07-21 RX ADMIN — POLYETHYLENE GLYCOL (3350) SCH GM: 17 POWDER, FOR SOLUTION ORAL at 19:44

## 2019-07-21 RX ADMIN — ENOXAPARIN SODIUM SCH MG: 100 INJECTION SUBCUTANEOUS at 13:12

## 2019-07-21 RX ADMIN — DONEPEZIL HYDROCHLORIDE SCH MG: 5 TABLET, FILM COATED ORAL at 19:50

## 2019-07-21 RX ADMIN — Medication SCH EA: at 06:40

## 2019-07-21 RX ADMIN — DOCUSATE SODIUM AND SENNOSIDES SCH EA: 8.6; 5 TABLET, FILM COATED ORAL at 08:05

## 2019-07-21 RX ADMIN — ASPIRIN SCH MG: 81 TABLET ORAL at 19:50

## 2019-07-21 RX ADMIN — DOCUSATE SODIUM SCH MG: 100 CAPSULE ORAL at 08:05

## 2019-07-21 RX ADMIN — MEMANTINE HYDROCHLORIDE SCH MG: 10 TABLET ORAL at 19:50

## 2019-07-21 RX ADMIN — ACETAMINOPHEN PRN MG: 500 TABLET ORAL at 16:42

## 2019-07-21 RX ADMIN — OMEGA-3 FATTY ACIDS CAP 1000 MG SCH MG: 1000 CAP at 08:03

## 2019-07-21 RX ADMIN — SIMVASTATIN SCH MG: 20 TABLET, FILM COATED ORAL at 19:51

## 2019-07-21 RX ADMIN — VENLAFAXINE HYDROCHLORIDE SCH MG: 75 TABLET ORAL at 13:10

## 2019-07-21 NOTE — NUR
pt amb with steady gait sba assist with walker. no c/o. pt daughter at bedside. pt rested 
most of noc with eyes closed resp reg et easy
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
## 2019-07-22 VITALS — DIASTOLIC BLOOD PRESSURE: 75 MMHG | SYSTOLIC BLOOD PRESSURE: 143 MMHG

## 2019-07-22 VITALS — SYSTOLIC BLOOD PRESSURE: 144 MMHG | DIASTOLIC BLOOD PRESSURE: 63 MMHG

## 2019-07-22 RX ADMIN — ASPIRIN SCH MG: 81 TABLET ORAL at 20:21

## 2019-07-22 RX ADMIN — MEMANTINE HYDROCHLORIDE SCH MG: 10 TABLET ORAL at 20:21

## 2019-07-22 RX ADMIN — Medication SCH EA: at 06:06

## 2019-07-22 RX ADMIN — LORAZEPAM SCH MG: 0.5 TABLET ORAL at 13:17

## 2019-07-22 RX ADMIN — LACTULOSE SCH GM: 20 SOLUTION ORAL at 20:35

## 2019-07-22 RX ADMIN — RISPERIDONE SCH MG: 0.25 TABLET, FILM COATED ORAL at 08:45

## 2019-07-22 RX ADMIN — LACTULOSE SCH GM: 20 SOLUTION ORAL at 08:47

## 2019-07-22 RX ADMIN — ACETAMINOPHEN PRN MG: 500 TABLET ORAL at 09:12

## 2019-07-22 RX ADMIN — POLYETHYLENE GLYCOL (3350) SCH GM: 17 POWDER, FOR SOLUTION ORAL at 20:35

## 2019-07-22 RX ADMIN — DOCUSATE SODIUM SCH MG: 100 CAPSULE ORAL at 08:47

## 2019-07-22 RX ADMIN — LORAZEPAM SCH MG: 0.5 TABLET ORAL at 20:21

## 2019-07-22 RX ADMIN — VENLAFAXINE HYDROCHLORIDE SCH MG: 75 CAPSULE, EXTENDED RELEASE ORAL at 06:05

## 2019-07-22 RX ADMIN — POLYETHYLENE GLYCOL (3350) SCH GM: 17 POWDER, FOR SOLUTION ORAL at 08:47

## 2019-07-22 RX ADMIN — DOCUSATE SODIUM SCH MG: 100 CAPSULE ORAL at 20:26

## 2019-07-22 RX ADMIN — LATANOPROST SCH DROP: 50 SOLUTION/ DROPS OPHTHALMIC at 20:19

## 2019-07-22 RX ADMIN — ENOXAPARIN SODIUM SCH MG: 100 INJECTION SUBCUTANEOUS at 14:18

## 2019-07-22 RX ADMIN — DONEPEZIL HYDROCHLORIDE SCH MG: 5 TABLET, FILM COATED ORAL at 08:44

## 2019-07-22 RX ADMIN — SIMVASTATIN SCH MG: 20 TABLET, FILM COATED ORAL at 20:21

## 2019-07-22 RX ADMIN — MEMANTINE HYDROCHLORIDE SCH MG: 10 TABLET ORAL at 08:45

## 2019-07-22 RX ADMIN — ACETAMINOPHEN PRN MG: 500 TABLET ORAL at 15:30

## 2019-07-22 RX ADMIN — DONEPEZIL HYDROCHLORIDE SCH MG: 5 TABLET, FILM COATED ORAL at 20:20

## 2019-07-22 RX ADMIN — VENLAFAXINE HYDROCHLORIDE SCH MG: 75 TABLET ORAL at 13:17

## 2019-07-22 RX ADMIN — OMEGA-3 FATTY ACIDS CAP 1000 MG SCH MG: 1000 CAP at 08:45

## 2019-07-22 RX ADMIN — RISPERIDONE SCH MG: 0.25 TABLET, FILM COATED ORAL at 20:35

## 2019-07-22 RX ADMIN — DOCUSATE SODIUM AND SENNOSIDES SCH EA: 8.6; 5 TABLET, FILM COATED ORAL at 20:35

## 2019-07-22 RX ADMIN — LORAZEPAM SCH MG: 0.5 TABLET ORAL at 08:45

## 2019-07-22 RX ADMIN — DOCUSATE SODIUM AND SENNOSIDES SCH EA: 8.6; 5 TABLET, FILM COATED ORAL at 08:47

## 2019-07-22 RX ADMIN — LOSARTAN POTASSIUM SCH MG: 50 TABLET, FILM COATED ORAL at 08:45
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
## 2019-07-22 NOTE — PHYSICAL THERAPY DAILY NOTE
PT Daily Note-Current


Subjective


Pt. agrees to Rx.  Smiling , feels she is improved





Pain





   Location:  No Pain Reported





Mental Status


Patient Orientation:  Person, Place, Time, Situation





Transfers


Therapy Code Descriptions/Definitions 





Functional [place] Measure:


0=Not Assessed/NA        4=Minimal Assistance


1=Total Assistance        5=Supervision or Setup


2=Maximal Assistance  6=Modified [place]


3=Moderate Assistance 7=Complete [place]








Therapy Quality Codes:


6    Independent with activity with or without an assistive device


5    Patient requires set up or clean up by helper.  Patient completes activity 

by  themselves


4    Supervision or touching assist (CGA). [place] provide cues , steadying 

assist


3    The helper provides less than half the effort to complete the activity


2    The helper provides more than half the effort to complete the activity


1    Dependent.  The helper does all the effort to complete an activity 


7    Patient refused to complete or attempt activity


9    The patient did not perform the activity before the current illness or 

injury


88  Not attempted due to Medical conditions or safety concerns


Transfers (B, C, W/C) (FIM):  6


Scootin


Rollin


Supine to/from Sit:  6


Sit to/from Stand:  6


Bed to/from Chair:  6


Car Transfer (QC):  6





Weight Bearing


Full Weight Bearing


Full Weight Bearing





Gait Training


Does the Patient Walk?:  Yes


Gait (FIM):  5


Distance (FIM):  3=150 ft (200x3)


Gait Level of Assist:  5


Gait Persons Needed:  0


Gait Assistive Device:  FWW


pt. needs directed to destination only





Stair Training


 Stair Training: Handrails/:  2 handrails


Stairs (FIM):  5


#of Steps:  4


Stairs:  Pattern:  Reciprocal


Level of Assist:  5


household exception





Exercises


Supine Ex:  Bridging (x2), Ankle pumps, Quad Set, Rolling, Glut sets, Heel 

Slides, Short Arc Quads, Scooting, Hip abd/add


Supine Reps:  12


Seated Therapy Exercises:  Ankle pumps, Sit to stand, Long arc quads, Hip 

flexion, Hip abd/add


Seated Reps:  10 (x2)


NuStep Minutes:  12


 NuStep Workload:  4





Assessment


Current Status:  Good Progress





PT Short Term Goals


Short Term Goals


Time Frame:  2019


Transfers (B,C,W/C) (FIM):  5


Gait (FIM):  5





PT Long Term Goals


Long Term Goals


PT Long Term Goals Time Frame:  2019


Transfers (B,C,W/C) (FIM):  7


Sit to Lying (QC):  6


Lying-Sitting on Side/Bed(QC):  6


Sit to Stand (QC):  6


Rollin


Roll Left to Right (QC):  6


Chair/Bed-to-Chair Xfer(QC):  6


Car Transfer (QC):  6


Does the Patient Walk:  Yes


Gait (FIM):  6


Gait distance (FIM):  3=150 ft


Walk 10 feet (QC):  6


Walk 10ft-Uneven Surface(QC):  6


Walk 50ft with 2 Turns (QC):  6


Walk 150 ft (QC):  6


Gait Assistive Device:  FWW


Does the Pt use WC or Scooter?:  No


Stairs (FIM):  5


# of Steps:  4


1 Step (curb) (QC):  6


4 Steps (QC):  6


12 Steps (QC):  88


Picking up an Object (QC):  88





PT Plan


Treatment/Plan


Treatment Plan:  Continue Plan of Care


Treatment Plan:  Bed Mobility, Education, Functional Activity [place], Functional 

Strength, Group Therapy, Gait, Safety, Therapeutic Exercise, Transfers


Treatment Duration:  2019


Frequency:  At least 5 of 7 days/Wk (IRF)


Estimated Hrs Per Day:  1.5 hours per day


Patient and/or Family Agrees t:  Yes





Safety Risks/Education


Patient Education:  Gait Training, Transfer Techniques, Steps, Correct 

Positioning, Disease Process, Safety Issues


Teaching Recipient:  Patient


Teaching Methods:  Demonstration, Discussion


Response to Teaching:  Verbalize Understanding, Return Demonstration, 

Reinforcement Needed





Time/GCodes


Time In:  1100


Time Out:  1145


Total Billed Treatment Time:  45


Total Billed Treatment


1,GT15m,EX20m,FA10m


G Codes Necessary:  FATOUMATA Montoya PTA             2019 12:10

## 2019-07-22 NOTE — OCCUPATIONAL THER DAILY NOTE
OT Current Status-Daily Note


Subjective


Pt alert, in bathroom with nrsg.  Pt requires cues to initiate tasks.  Confusion

of situation apparent during tasks.  Daughter present in room.  No c/o pain at 

this time, only fatigue.





Mental Status/Objective


Patient Orientation:  Person, Place, Time, Situation


Therapy Code Descriptions/Definitions 





Functional [place] Measure:


0=Not Assessed/NA        4=Minimal Assistance


1=Total Assistance        5=Supervision or Setup


2=Maximal Assistance  6=Modified [place]


3=Moderate Assistance 7=Complete [place]





ADL-Treatment


Do to confusion and different surroundings, pt has difficulty problem solving 

and sequencing tasks. Pt requires verbal cues to initiate or find items.  

Daughter set clothes out prior to shower.  After therapy, pt sitting in recliner

with call light/phone in reach.  All needs met in room.


Therapy Code Descriptions/Definitions 





Functional [place] Measure:


0=Not Assessed/NA        4=Minimal Assistance


1=Total Assistance        5=Supervision or Setup


2=Maximal Assistance  6=Modified [place]


3=Moderate Assistance 7=Complete [place]








Therapy Quality Codes:


6    Independent with activity with or without an assistive device


5    Patient requires set up or clean up by helper.  Patient completes activity 

by  themselves


4    Supervision or touching assist (CGA). [place] provide cues , steadying 

assist


3    The helper provides less than half the effort to complete the activity


2    The helper provides more than half the effort to complete the activity


1    Dependent.  The helper does all the effort to complete an activity 


7    Patient refused to complete or attempt activity


9    The patient did not perform the activity before the current illness or 

injury


88  Not attempted due to Medical conditions or safety concerns


Eating (FIM):  5 (Pt able to open containers/packages and use regular utensils. 

Verbal cues to initiate opening and using items for seasoning on tray.)


Eating (QC):  5


Grooming (FIM):  5 (Pt able to stand and complete tasks though verbal cues to 

initiate task.)


Oral Hygiene (QC):  5


Bathing (FIM):  5 (Assist to set up for bathing.  Pt able to complete bathing by

self using grabbar, hand held shower and shower bench.  Pt stood to complete 

most of shower.  No LOB noted.)


Bathing Location:  L Arm, R Arm, L Upper Leg, R Upper Leg, L Lower Leg 

(including foot), R Lower Leg (including foot), Chest, Abdomen, Buttocks, 

Perineal Area


Shower/Bathe Self (QC):  5


Upper Body (FIM):  4 (Assist to turn bra around.  Pt able to complete all other 

upper body dressing by self.  Daughter retrieved clothing.)


Upper Body Dressing (QC):  4


Lower Body Dressing (FIM):  4 (Pt completed doffing pants and socks.  Donned own

pants, R sock and L sock with sock aide (verbal/physical cues to use sock aide).

 Assist to don L shoe and pt donned R shoe by self.)


Lower Body Dressing (QC):  3


On/Off Footwear (QC):  3


Toileting (FIM):  6 (Pt able to complete toileting using grabbar and FWW for 

stability in standing.)


Toileting Hygiene (QC):  6


Transfers (B, C, W/C) (FIM):  6 (Using FWW)


Toilet/Commode Transfer (FIM):  6 (Using FWW and grabbars.)


Toilet Transfer (QC):  6


Shower Transfer(FIM):  6 (Using grabbars, shower bench and FWW.)





OT Short Term Goals


Short Term Goals


Transfers (B,C,W/C) (FIM):  5


1=Demonstrate adherence to instructed precautions during ADL tasks.


2=Patient will verbalize/demonstrate understanding of assistive 

devices/modifications for ADL.


3=Patient will improve strength/tolerance for activity to enable patient to per

form ADL's.





OT Long Term Goals


Long Term Goals


Time Frame:  2019


Eating (FIM):  6 (not met)


Eating (QC):  6 (not met)


Groomin (not met)


Oral Hygiene (QC):  6 (not met)


Bathing(FIM):  6 (not met)


Shower/Bathe Self (QC):  5 (met-19)


Upper Body Dressing(FIM):  6 (not met)


Upper Body Dressing (QC):  5 (not met)


Lower Body Dressing(FIM):  6 (not met)


Lower Body Dressing (QC):  5 (not met)


On/Off Footwear (QC):  5 (not met)


Toileting(FIM):  6 (met-19)


Toileting Hygiene (QC):  6 (met-19)


Transfers (B,C,W/C) (FIM):  6 (met-19)


Toilet/Commode Transfer(FIM):  6 (met-19)


Toilet/Commode Transfer (QC):  6 (met-19)


Shower Transfer(FIM):  5 (met-19)


Comprehension(FIM):  5


Expression (FIM):  5


Social Interaction(FIM):  5


Problem Solving(FIM):  5


Memory(FIM):  5


Additional Goals:  1-Demonstrate ADL Tasks, 2-Verbalize Understanding, 3-Imp

roveStrength/[place]


1=Demonstrate adherence to instructed precautions during ADL tasks.


2=Patient will verbalize/demonstrate understanding of assistive 

devices/modifications for ADL.


3=Patient will improve strength/tolerance for activity to enable patient to 

perform ADL's.





OT Education/Plan


Problem List/Assessment


Assessment:  Decreased Safety Aware, Impaired Cognition, Impaired Self-Care 

Skills





Discharge Recommendations


Plan/Recommendations:  Continue POC


Therapy D/C Recommendations:  Occupational Therapy Home Care


Equpiment Recommendations-D/C:  Sock Aide





Treatment Plan/Plan of Care


Patient would benefit from OT for education, treatment and training to promote 

independence in ADL's, mobility, safety and/or upper extremity function for 

ADL's.


Plan of Care:  ADL Retraining, Caregiver Training, Functional Mobility, Group 

Exercise/Act as Ind, UE Funct Exercise/Act


Treatment Duration:  2019


Frequency:  At least 5 of 7 days/Wk (IRF)


Estimated Hrs Per Day:  1.5 hours per day


Agreement:  Yes


Rehab Potential:  Good





Time/GCodes


Start Time:  08:00


Stop Time:  09:00


Total Time Billed (hr/min):  60


Billed Treatment Time


1 visit-ADL 4 (60 min)











GUSTAVO NOBLE               2019 08:50

## 2019-07-22 NOTE — NUR
assessments & interventions completed, see assessments &  interventions, states  no pain, 
daughter at bedside tele sitter in place

## 2019-07-22 NOTE — NUR
Pastoral care visit, with pts daughter and ,  appears close to family and 
supportive. will monitor for needs.

## 2019-07-22 NOTE — THERAPY GROUP DAILY NOTE
Therapy Daily Group Note


Patient Education Topic


Incontinence, Other List Below (dehydration)





Exercises


Other (kegels)





Session


Ratio (pt:therapist):  4:1


Goal of Session:  Education on ARU Expectations, Other (list) (understanding of 

dehydration and incontinence)


Goal Met for this Session:  Yes


Pt Benefit of Group:  Contributions to Others, Socialization, Other 

(understanding of dehydration and incontinence)





Other/Notes


Pt. participated in group PT OT session this date. Pt. came and went with FWW 

and SBA.  Pt. was social introducing herself and was very social. Pts. were 

educated in the causes signs and symptoms of incontinence and dehydration as 

well as prevention.  Pts. were introduced to incont briefs and their role in 

above topics. Pts were instructed in Kegel exercises .  Pt. to room after Rx, 

bell at hand.


Start Time:  13:00


Stop Time:  14:00


Total Billed Treatment Time:  60


Total Billed Treatment


1,GRP











FATOUMATA TOWNSEND PTA             Jul 22, 2019 15:00
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
## 2019-07-22 NOTE — SPEECH THERAPY DAILY NOTE
Speech Daily Progress Note


Subjective


Date Seen by Provider:  2019


Time Seen by Provider:  00:30


The patient was resting in her recliner when I entered her room. She states she 

feels she is getting stronger.





Objective


The patient completed memory tasks related to her daily needs with 75% accuracy 

given moderate to maximum verbal cues.





Assessment


Assessment Current Status:  Good Progress





Treatment Plan


Continue Plan of Care





Communication


Comprehension:  5


Expression:  4





Social Cognition


Social Interaction:  6


Problem Solving:  3


Memory:  3





Speech Short Term Goals


Short Term Goals


Short Term Goals


1) The patient will complete memory tasks with 80% or greater with minimal cues.


2) The patient will complete problem solving tasks with 80% or greater with 

minimal cues.


3) The patient will complete safety awareness tasks with 80% or greater with 

minimal cues.





Speech Long Term Goals


Long Term Goals


The patient will improve her safety awareness and independence in order to 

return home safely with her .


Comprehension:  5


Expression:  5


Social Interaction:  5


Problem Solvin


Memory:  5





Speech-Plan


Patient/Family Goals


Patient/Family Goals:  


The patient plans on returning home with family post rehab.





Treatment Plan


Speech Therapy Treatment Plan:  Continue Plan of Care


The patient is making slight progress toward meeting her goals.


Treatment Duration:  2019


Frequency:  5 times per week


Estimated Hrs Per Day:  .5 hour per day


Rehab Potential:  Good


Barriers to Learning:  


The patient has Alzheimer's


Pt/Family Agrees to Plan:  Yes





Safety Risks/Education


Teaching Recipient:  Patient


Teaching Methods:  Discussion


Response to Teaching:  Verbalize Understanding


Education Topics Provided:  


Continued safety within her room.





Time


Speech Therapy Time In:  15:30


Speech Therapy Time Out:  16:00


Total Billed Time:  30


Billed Treatment Time


1EPI [address]            2019 15:57
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
## 2019-07-23 VITALS — DIASTOLIC BLOOD PRESSURE: 74 MMHG | SYSTOLIC BLOOD PRESSURE: 152 MMHG

## 2019-07-23 VITALS — SYSTOLIC BLOOD PRESSURE: 169 MMHG | DIASTOLIC BLOOD PRESSURE: 74 MMHG

## 2019-07-23 RX ADMIN — LATANOPROST SCH DROP: 50 SOLUTION/ DROPS OPHTHALMIC at 20:31

## 2019-07-23 RX ADMIN — Medication SCH EA: at 06:31

## 2019-07-23 RX ADMIN — DOCUSATE SODIUM AND SENNOSIDES SCH EA: 8.6; 5 TABLET, FILM COATED ORAL at 20:34

## 2019-07-23 RX ADMIN — SIMVASTATIN SCH MG: 20 TABLET, FILM COATED ORAL at 20:28

## 2019-07-23 RX ADMIN — DOCUSATE SODIUM SCH MG: 100 CAPSULE ORAL at 20:33

## 2019-07-23 RX ADMIN — VENLAFAXINE HYDROCHLORIDE SCH MG: 75 TABLET ORAL at 12:44

## 2019-07-23 RX ADMIN — ASPIRIN SCH MG: 81 TABLET ORAL at 20:28

## 2019-07-23 RX ADMIN — LORAZEPAM SCH MG: 0.5 TABLET ORAL at 08:53

## 2019-07-23 RX ADMIN — LORAZEPAM SCH MG: 0.5 TABLET ORAL at 12:44

## 2019-07-23 RX ADMIN — ACETAMINOPHEN PRN MG: 500 TABLET ORAL at 09:03

## 2019-07-23 RX ADMIN — LOSARTAN POTASSIUM SCH MG: 50 TABLET, FILM COATED ORAL at 08:53

## 2019-07-23 RX ADMIN — DOCUSATE SODIUM AND SENNOSIDES SCH EA: 8.6; 5 TABLET, FILM COATED ORAL at 08:56

## 2019-07-23 RX ADMIN — LORAZEPAM SCH MG: 0.5 TABLET ORAL at 20:27

## 2019-07-23 RX ADMIN — RISPERIDONE SCH MG: 0.25 TABLET, FILM COATED ORAL at 08:53

## 2019-07-23 RX ADMIN — DOCUSATE SODIUM SCH MG: 100 CAPSULE ORAL at 08:55

## 2019-07-23 RX ADMIN — ENOXAPARIN SODIUM SCH MG: 100 INJECTION SUBCUTANEOUS at 12:48

## 2019-07-23 RX ADMIN — POLYETHYLENE GLYCOL (3350) SCH GM: 17 POWDER, FOR SOLUTION ORAL at 08:55

## 2019-07-23 RX ADMIN — RISPERIDONE SCH MG: 0.25 TABLET, FILM COATED ORAL at 20:28

## 2019-07-23 RX ADMIN — LACTULOSE SCH GM: 20 SOLUTION ORAL at 20:34

## 2019-07-23 RX ADMIN — POLYETHYLENE GLYCOL (3350) SCH GM: 17 POWDER, FOR SOLUTION ORAL at 20:34

## 2019-07-23 RX ADMIN — DONEPEZIL HYDROCHLORIDE SCH MG: 5 TABLET, FILM COATED ORAL at 20:27

## 2019-07-23 RX ADMIN — LACTULOSE SCH GM: 20 SOLUTION ORAL at 08:55

## 2019-07-23 RX ADMIN — MEMANTINE HYDROCHLORIDE SCH MG: 10 TABLET ORAL at 20:29

## 2019-07-23 RX ADMIN — OMEGA-3 FATTY ACIDS CAP 1000 MG SCH MG: 1000 CAP at 08:53

## 2019-07-23 RX ADMIN — MEMANTINE HYDROCHLORIDE SCH MG: 10 TABLET ORAL at 08:53

## 2019-07-23 RX ADMIN — DONEPEZIL HYDROCHLORIDE SCH MG: 5 TABLET, FILM COATED ORAL at 08:53

## 2019-07-23 RX ADMIN — VENLAFAXINE HYDROCHLORIDE SCH MG: 75 CAPSULE, EXTENDED RELEASE ORAL at 06:31

## 2019-07-23 NOTE — PHYSICAL THERAPY DAILY NOTE
PT Daily Note-Current


Subjective


Pt sitting in recliner upon arrival.  Pt's daughter present.  Pt agrees to PT.





Pain





   Location:  No Pain Reported





Mental Status


Patient Orientation:  Person, Confused, Place





Transfers


Therapy Code Descriptions/Definitions 





Functional [place] Measure:


0=Not Assessed/NA        4=Minimal Assistance


1=Total Assistance        5=Supervision or Setup


2=Maximal Assistance  6=Modified [place]


3=Moderate Assistance 7=Complete [place]








Therapy Quality Codes:


6    Independent with activity with or without an assistive device


5    Patient requires set up or clean up by helper.  Patient completes activity 

by  themselves


4    Supervision or touching assist (CGA). [place] provide cues , steadying 

assist


3    The helper provides less than half the effort to complete the activity


2    The helper provides more than half the effort to complete the activity


1    Dependent.  The helper does all the effort to complete an activity 


7    Patient refused to complete or attempt activity


9    The patient did not perform the activity before the current illness or 

injury


88  Not attempted due to Medical conditions or safety concerns





Weight Bearing


Full Weight Bearing


Full Weight Bearing





Exercises


Supine Ex:  Ankle pumps, Quad Set, Heel Slides, Short Arc Quads, Straight leg 

raise, Hip abd/add


Supine Reps:  15


Seated Therapy Exercises:  Ankle pumps, Long arc quads, Hip flexion, Kicking 

activity


Seated Reps:  15





Treatments


PTA gave pt HEP as well as gave instruction to pt & daughter on how to complete 

HEP.  Pt resting in recliner after EX.  Pt needs assistance with ambulating to 

restroom.  Pt has urinated self and PTA gives instruction to pt on how to 

complete don/doff of undergarments & pants from seated position.  Pt has all 

needs met, call light next to pt.





Assessment


Current Status:  Good Progress


Pt fatigues easily and continues to demonstrate confusion.





PT Short Term Goals


Short Term Goals


Time Frame:  2019


Transfers (B,C,W/C) (FIM):  5


Gait (FIM):  5





PT Long Term Goals


Long Term Goals


PT Long Term Goals Time Frame:  2019


Transfers (B,C,W/C) (FIM):  7


Sit to Lying (QC):  6


Lying-Sitting on Side/Bed(QC):  6


Sit to Stand (QC):  6


Rollin


Roll Left to Right (QC):  6


Chair/Bed-to-Chair Xfer(QC):  6


Car Transfer (QC):  6


Does the Patient Walk:  Yes


Gait (FIM):  6


Gait distance (FIM):  3=150 ft


Walk 10 feet (QC):  6


Walk 10ft-Uneven Surface(QC):  6


Walk 50ft with 2 Turns (QC):  6


Walk 150 ft (QC):  6


Gait Assistive Device:  FWW


Does the Pt use WC or Scooter?:  No


Stairs (FIM):  5


# of Steps:  4


1 Step (curb) (QC):  6


4 Steps (QC):  6


12 Steps (QC):  88


Picking up an Object (QC):  88





PT Plan


Problem List


Problem List:  Activity Tolerance, Safety





Treatment/Plan


Treatment Plan:  Continue Plan of Care


Treatment Plan:  Bed Mobility, Education, Functional Activity [place], Functional 

Strength, Group Therapy, Gait, Safety, Therapeutic Exercise, Transfers


Treatment Duration:  2019


Frequency:  At least 5 of 7 days/Wk (IRF)


Estimated Hrs Per Day:  1.5 hours per day


Patient and/or Family Agrees t:  Yes





Safety Risks/Education


Patient Education:  Correct Positioning, Safety Issues


Teaching Recipient:  Patient


Teaching Methods:  Discussion


Response to Teaching:  Reinforcement Needed





Time/GCodes


Time In:  1315


Time Out:  1335


Total Billed Treatment Time:  20


Total Billed Treatment


1, EX (20m)


G Codes Necessary:  FAUSTINO Conte PTA              2019 14:43

## 2019-07-23 NOTE — PHYSICAL THERAPY DAILY NOTE
PT Daily Note-Current


Subjective


Pt sitting in recliner upon arrival. Pt agrees to PT for FIM scoring for D/C 

tomorrow.





Pain





   Location:  No Pain Reported





Mental Status


Patient Orientation:  Person, Confused, Place





Transfers


Therapy Code Descriptions/Definitions 





Functional [place] Measure:


0=Not Assessed/NA        4=Minimal Assistance


1=Total Assistance        5=Supervision or Setup


2=Maximal Assistance  6=Modified [place]


3=Moderate Assistance 7=Complete [place]








Therapy Quality Codes:


6    Independent with activity with or without an assistive device


5    Patient requires set up or clean up by helper.  Patient completes activity 

by  themselves


4    Supervision or touching assist (CGA). [place] provide cues , steadying 

assist


3    The helper provides less than half the effort to complete the activity


2    The helper provides more than half the effort to complete the activity


1    Dependent.  The helper does all the effort to complete an activity 


7    Patient refused to complete or attempt activity


9    The patient did not perform the activity before the current illness or 

injury


88  Not attempted due to Medical conditions or safety concerns


Transfers (B, C, W/C) (FIM):  6


Scootin


Rollin


Roll Left to Right (QC):  6


Supine to/from Sit:  6


Sit to/from Stand:  6


Sit to Lying (QC):  6


Sit to Stand (QC):  6


Chair/Bed-to-Chair Xfer(QC):  6


Bed to/from Chair:  6


Car Transfer (QC):  6


Pt is Mod I but safety concerns due to pt needing reminders for sequencing.  Pt 

also needs reminder to take FWW with her for transfers and ambulation.





Weight Bearing


Full Weight Bearing


Full Weight Bearing





Gait Training


Does the Patient Walk?:  Yes


Gait (FIM):  6


Distance (FIM):  3=150 ft


Distance:  250'


Walk 10 feet (QC):  6


Walk 50 ft with 2 Turns(QC):  6


Walk 150 ft (QC):  6


Walking 10ft/uneven surface-QC:  6


Gait Level of Assist:  6


Gait Persons Needed:  1


Gait Assistive Device:  FWW


Pt needs VC to take FWW with her during transfers and ambulation.





Wheelchair Training


Does the Pt Use a Wheelchair?:  No





Stair Training


 Stair Training: Handrails/:  2 handrails


Stairs (FIM):  6


#of Steps:  12


1 Step (curb) (QC):  6


4 Steps (QC):  6


12 Steps (QC):  6


Stairs:  Pattern:  Reciprocal


Level of Assist:  6


PTA gives VC for sequencing.





Balance


Picking up an Object (QC):  88


Special Test Comments


PTA has safety concerns with sequencing and balance with bending over.





Exercises


NuStep Minutes:  15


 NuStep Workload:  5





Treatments


Pt completes FIM scoring items including: bed mobility, transfers including car 

transfer, ambulation including across varying surface, and stairs.  Pt did not 

attempt picking up object from floor due to safety concern.  Pt returns to room 

to rest at end of tx.





Assessment


Current Status:  Good Progress


Pt is able to complete tasks during tx although pt needs VC for social awareness

deficit. Pt continues to try to ambulate w/o FWW.





PT Short Term Goals


Short Term Goals


Time Frame:  2019


Transfers (B,C,W/C) (FIM):  5


Gait (FIM):  5





PT Long Term Goals


Long Term Goals


PT Long Term Goals Time Frame:  2019


Transfers (B,C,W/C) (FIM):  7


Sit to Lying (QC):  6


Lying-Sitting on Side/Bed(QC):  6


Sit to Stand (QC):  6


Rollin


Roll Left to Right (QC):  6


Chair/Bed-to-Chair Xfer(QC):  6


Car Transfer (QC):  6


Does the Patient Walk:  Yes


Gait (FIM):  6


Gait distance (FIM):  3=150 ft


Walk 10 feet (QC):  6


Walk 10ft-Uneven Surface(QC):  6


Walk 50ft with 2 Turns (QC):  6


Walk 150 ft (QC):  6


Gait Assistive Device:  FWW


Does the Pt use WC or Scooter?:  No


Stairs (FIM):  5


# of Steps:  4


1 Step (curb) (QC):  6


4 Steps (QC):  6


12 Steps (QC):  88


Picking up an Object (QC):  88





PT Plan


Problem List


Problem List:  Activity Tolerance, Safety, Balance





Treatment/Plan


Treatment Plan:  Continue Plan of Care


Treatment Plan:  Bed Mobility, Education, Functional Activity [place], Functional 

Strength, Group Therapy, Gait, Safety, Therapeutic Exercise, Transfers


Treatment Duration:  2019


Frequency:  At least 5 of 7 days/Wk (IRF)


Estimated Hrs Per Day:  1.5 hours per day


Patient and/or Family Agrees t:  Yes





Safety Risks/Education


Patient Education:  Gait Training, Transfer Techniques, Steps, Correct 

Positioning, Safety Issues


Teaching Recipient:  Patient


Teaching Methods:  Discussion


Response to Teaching:  Reinforcement Needed





Time/GCodes


Time In:  1100


Time Out:  1200


Total Billed Treatment Time:  60


Total Billed Treatment


1, FA x3 (45m) & EX (15m)


G Codes Necessary:  FAUSTINO Conte PTA              2019 11:54

## 2019-07-23 NOTE — NUR
assessments & interventions completed, see assessments & interventions, daughter at bedside, 
side rails up x4, bed alarm on, tele sitter in place
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
## 2019-07-24 VITALS — SYSTOLIC BLOOD PRESSURE: 151 MMHG | DIASTOLIC BLOOD PRESSURE: 72 MMHG

## 2019-07-24 RX ADMIN — MEMANTINE HYDROCHLORIDE SCH MG: 10 TABLET ORAL at 08:54

## 2019-07-24 RX ADMIN — DONEPEZIL HYDROCHLORIDE SCH MG: 5 TABLET, FILM COATED ORAL at 08:49

## 2019-07-24 RX ADMIN — LACTULOSE SCH GM: 20 SOLUTION ORAL at 08:34

## 2019-07-24 RX ADMIN — DOCUSATE SODIUM AND SENNOSIDES SCH EA: 8.6; 5 TABLET, FILM COATED ORAL at 08:34

## 2019-07-24 RX ADMIN — VENLAFAXINE HYDROCHLORIDE SCH MG: 75 CAPSULE, EXTENDED RELEASE ORAL at 06:26

## 2019-07-24 RX ADMIN — RISPERIDONE SCH MG: 0.25 TABLET, FILM COATED ORAL at 08:50

## 2019-07-24 RX ADMIN — LORAZEPAM SCH MG: 0.5 TABLET ORAL at 08:49

## 2019-07-24 RX ADMIN — LOSARTAN POTASSIUM SCH MG: 50 TABLET, FILM COATED ORAL at 08:49

## 2019-07-24 RX ADMIN — DOCUSATE SODIUM SCH MG: 100 CAPSULE ORAL at 08:33

## 2019-07-24 RX ADMIN — OMEGA-3 FATTY ACIDS CAP 1000 MG SCH MG: 1000 CAP at 08:49

## 2019-07-24 RX ADMIN — Medication SCH EA: at 06:26

## 2019-07-24 RX ADMIN — POLYETHYLENE GLYCOL (3350) SCH GM: 17 POWDER, FOR SOLUTION ORAL at 08:34

## 2019-07-24 RX ADMIN — ACETAMINOPHEN PRN MG: 500 TABLET ORAL at 01:14
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
## 2019-07-24 NOTE — D/C HH FACE TO FACE ORDER
D/C  Face to Face Orders


Instructions for Patient


[facility], [phone]


Patient Instructions/FollowUp:  


PCP in 1 week


Physician to follow Patient:  PCP


Discharge Diet for Home:  No Restrictions


Patient Problems:  


L1 compression fracture


Dementia





Patient Data-Allergies,Ht & Wt


Patient Allergies:  


Coded Allergies:  


     Tetanus Vaccines and Toxoid (Unverified  Allergy, Unknown, 7/16/19)


     cyclobenzaprine (Verified  Allergy, Unknown, 7/17/19)


     hydrocodone (Unverified  Allergy, Unknown, 7/16/19)


Uncoded Allergies:  


     TAPE (Allergy, Unknown, 7/16/19)


Height (Feet):  5


Height (Inches):  5.00


Weight (Pounds):  173


Weight (Ounces):  4.5





Home Health Need/Face to Face


Date of Face to Face:  Jul 24, 2019


Clinical Findings:  Generalized weakness and fatigue, Muscle weakness, Pain with

ambulation, Unsteady gait


I have seen Pt face-to-face:  Yes


Discharged To:  Home


Diagnosis/Conditions:  


L1 compression fracture


Dementia


Patient is Homebound due to:  CognItive deficits, Robby fall risk due to 

instabilty, Muscle weakness, Pain w/ambulation


Homebound Status


   Due to the above stated illness, injury or surgical procedure (medical 

condition or diagnosis) and associated clinical findings, the patient is 

homebound because of his/her inability to leave home except with aid of a 

supportive device and/or person AND leaving the home requires a considerable and

taxing effort or is medically contraindicated.


Pt req the following assistanc:  Walker





Home Health Nursing Orders


Home Health Services Order:  Nursing Services, Occupational Ther-Evaluate & 

Treat, Physical Therapy-Evaluate & Treat


Certify Stmt


I certify that this patient is under my care and that I, a nurse practitioner or

a physician; a assistant working with me, had a face to face encounter that -

meets the physician face to face encounter requirements with this patient as 

dated.











IWONA SY DO                Jul 24, 2019 08:17
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
## 2019-07-24 NOTE — THERAPY TEAM DISCHARGE SUMMARY
Therapy Discharge Summary


Discharge Recommendations


Date of Discharge


2019 at 09:40


Therapy D/C Recommendations:  Home w/ Family Support, Occupational Therapy Home 

Care, Physical Therapy Home Care





Occupational Therapy


Pt. was seen to increase overall strength and independence with daily tasks.  

Pt. required mod I with toileting and ADL transfers at discharge, but required 

set up for dressing at discharge.  Pt. discharging home with family assist to 

continue working toward increased overall strength and independence.  Recommend 

home health OT.


Decreased Safety Aware, Impaired Cognition





PT Long Term Goals


Long Term Goals


PT Long Term Goals Time Frame:  2019


Transfers (B,C,W/C) (FIM):  7 (scored a 6)


Roll Left to Right (QC):  6


Sit to Lying (QC):  6


Lying-Sitting on Side/Bed(QC):  6


Sit to Stand (QC):  6


Chair/Bed-to-Chair Xfer(QC):  6


Car Transfer (QC):  6


Does the Patient Walk:  Yes


Gait (FIM):  6 (met)


Gait distance (FIM):  3=150 ft


Walk 10 feet (QC):  6


Walk 10ft-Uneven Surface(QC):  6


Walk 50ft with 2 Turns (QC):  6


Walk 150 ft (QC):  6


Gait Assistive Device:  FWW


Does the Pt use WC or Scooter?:  No


Stairs (FIM):  5 (exceeded)


# of Steps:  4


1 Step (curb) (QC):  6


4 Steps (QC):  6


12 Steps (QC):  88


Picking up an Object (QC):  88





OT Long Term Goals


Long Term Goals


Time Frame:  2019


Eating (FIM):  6 (met-19)


Eating (QC):  6 (met-19)


Oral Hygiene (QC):  6 (met-19)


Grooming(FIM):  6 (met-19)


Bathing(FIM):  6 (met-19)


Shower/Bathe Self (QC):  5 (met-19)


Upper Body Dressing(FIM):  6 (not met)


Upper Body Dressing (QC):  5 (met-19)


Lower Body Dressing(FIM):  6 (not met)


Lower Body Dressing (QC):  5 (met-19)


On/Off Footwear (QC):  5 (met-19)


Toileting(FIM):  6 (met-19)


Toileting Hygiene (QC):  6 (met-19)


Transfers (B,C,W/C) (FIM):  6 (met-19)


Toilet/Commode Transfer(FIM):  6 (met-19)


Toilet/Commode Transfer (QC):  6 (met-19)


Shower Transfer(FIM):  5 (met-19)


Comprehension(FIM):  5


Expression (FIM):  5


Social Interaction(FIM):  5


Problem Solving(FIM):  5


Memory(FIM):  5


Additional Goals:  1-Demonstrate ADL Tasks, 2-Verbalize Understanding, 3-

ImproveStrength/Quincy


1=Demonstrate adherence to instructed precautions during ADL tasks.


2=Patient will verbalize/demonstrate understanding of assistive devices/mo

difications for ADL.


3=Patient will improve strength/tolerance for activity to enable patient to 

perform ADL's.





Speech Long Term Goals


Long Term Goals


The patient will improve her safety awareness and independence in order to 

return home safely with her .


Comprehension:  5


Expression:  5


Social Interaction:  5


Problem Solvin


Memory:  5











DANNI LUI OT           2019 12:58

## 2020-01-06 ENCOUNTER — HOSPITAL ENCOUNTER (OUTPATIENT)
Dept: HOSPITAL 75 - RAD | Age: 75
End: 2020-01-06
Payer: MEDICARE

## 2020-01-06 DIAGNOSIS — Z12.31: Primary | ICD-10-CM

## 2020-01-06 PROCEDURE — 77067 SCR MAMMO BI INCL CAD: CPT

## 2020-01-06 NOTE — DIAGNOSTIC IMAGING REPORT
INDICATION: 

Routine screening.



COMPARISON:    

10/31/2018 and 10/12/2017.



TECHNIQUE: 

2D and 3D bilateral screening mammography was performed with CAD.



FINDINGS:

Both breasts are heterogeneously dense, limiting the sensitivity

of mammography. The area of circumscribed nodularity in the

retroareolar left breast appears stable. The outer portion of the

left breast previously noted appears stable. No new mass or

malignant appearing microcalcifications are seen. The axillae are

unremarkable.



IMPRESSION: 

No mammographic features suspicious for malignancy are

identified.



ACR BI-RADS Category 2: Benign findings.

Result letter will be mailed to the patient.

Note: At least 10% of breast cancer is not imaged by mammography.



Dictated by: 



  Dictated on workstation # [id number]

## 2020-01-21 NOTE — DIAGNOSTIC IMAGING REPORT
INDICATION: Routine screening.



COMPARISON: Comparison is made with prior mammograms from

10/12/2017 and 06/01/2015.



TECHNIQUE: 2D and 3D bilateral screening mammography was

performed with computer-aided detection (CAD) system.



FINDINGS: Both breasts are heterogeneously dense, limiting the

sensitivity of mammography. An ovoid nodular density in the

central left breast on the CC view appears stable and most

consistent with benign etiology. There is some nodularity to the

outer portion of the left breast on the CC view at posterior

depth, which appears more prominent when compared with prior

exams. Additional views of this area are recommended. No definite

correlate on the MLO view is seen. No suspicious calcifications

are identified. The axillae are unremarkable.



IMPRESSION: Left breast density. Additional views are recommended

for further evaluation.



ACR BI-RADS Category 0: Incomplete. (Needs additional imaging

evaluation).

Result letter will be mailed to the patient.

Note: At least 10% of breast cancer is not imaged by mammography.



Dictated by: 



  Dictated on workstation # [id number]

## 2021-12-02 ENCOUNTER — HOSPITAL ENCOUNTER (OUTPATIENT)
Dept: HOSPITAL 75 - ORTHO | Age: 76
End: 2021-12-02
Attending: ORTHOPAEDIC SURGERY
Payer: MEDICARE

## 2021-12-02 DIAGNOSIS — M17.11: Primary | ICD-10-CM

## 2021-12-02 PROCEDURE — 99202 OFFICE O/P NEW SF 15 MIN: CPT

## 2021-12-02 PROCEDURE — 73564 X-RAY EXAM KNEE 4 OR MORE: CPT

## 2021-12-02 NOTE — DIAGNOSTIC IMAGING REPORT
EXAMINATION: Right knee radiographs, 4 views.



COMPARISON: None. 



HISTORY: 76-year-old female, right knee pain. 



FINDINGS: There is moderate patellofemoral compartment joint

space loss with tiny patellofemoral compartment osteophytes.

There is no identified knee joint effusion. The medial and

lateral compartment joint spaces are well preserved. The patella

is normally positioned. There is no identified acute fracture.

The bones appear potentially demineralized. 



IMPRESSION: 

1. Moderate patellofemoral compartment osteoarthritis without

knee joint effusion.

2. The bones appear potentially demineralized. 



Dictated by: 



  Dictated on workstation # [id number]
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
## 2022-12-16 ENCOUNTER — HOSPITAL ENCOUNTER (OUTPATIENT)
Dept: HOSPITAL 75 - RAD | Age: 77
End: 2022-12-16
Attending: NURSE PRACTITIONER
Payer: MEDICARE

## 2022-12-16 DIAGNOSIS — R09.89: Primary | ICD-10-CM

## 2022-12-16 DIAGNOSIS — R13.10: ICD-10-CM

## 2022-12-16 PROCEDURE — 74230 X-RAY XM SWLNG FUNCJ C+: CPT

## 2022-12-16 NOTE — DIAGNOSTIC IMAGING REPORT
INDICATION: Dysphagia.



The procedure was performed in conjunction with speech pathology.





Video fluoroscopy was performed during the swallowing of barium

in multiple consistencies. 1.2 minutes of fluoroscopic time was

utilized. The patient ingested thin as well as puree, mechanical

soft and solid consistencies. There was some mild early spillover

to the level of the piriforms with thin liquids. No penetration

or aspiration was observed with any consistency. There was normal

laryngeal elevation and epiglottic tilt.



IMPRESSION: Mild spillover with thin liquids. No laryngeal

penetration or aspiration was observed.



Dictated by: 



  Dictated on workstation # [id number]

## 2023-10-09 ENCOUNTER — HOSPITAL ENCOUNTER (OUTPATIENT)
Dept: HOSPITAL 75 - LABNPT | Age: 78
End: 2023-10-09
Attending: INTERNAL MEDICINE
Payer: MEDICARE
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
## 2023-10-09 DIAGNOSIS — R41.82: Primary | ICD-10-CM

## 2023-10-09 LAB
APTT PPP: YELLOW S
BACTERIA #/AREA URNS HPF: (no result) /HPF
BILIRUB UR QL STRIP: NEGATIVE
FIBRINOGEN PPP-MCNC: CLEAR MG/DL
GLUCOSE UR STRIP-MCNC: NEGATIVE MG/DL
KETONES UR QL STRIP: (no result)
LEUKOCYTE ESTERASE UR QL STRIP: (no result)
NITRITE UR QL STRIP: NEGATIVE
PH UR STRIP: 5.5 [PH] (ref 5–9)
PROT UR QL STRIP: (no result)
RBC #/AREA URNS HPF: (no result) /HPF
SP GR UR STRIP: 1.02 (ref 1.02–1.02)
SQUAMOUS #/AREA URNS HPF: (no result) /HPF
WBC #/AREA URNS HPF: (no result) /HPF

## 2023-10-09 PROCEDURE — 87088 URINE BACTERIA CULTURE: CPT

## 2023-10-09 PROCEDURE — 81000 URINALYSIS NONAUTO W/SCOPE: CPT
